# Patient Record
Sex: FEMALE | Race: WHITE | NOT HISPANIC OR LATINO | Employment: FULL TIME | URBAN - METROPOLITAN AREA
[De-identification: names, ages, dates, MRNs, and addresses within clinical notes are randomized per-mention and may not be internally consistent; named-entity substitution may affect disease eponyms.]

---

## 2019-10-16 ENCOUNTER — CONSULT (OUTPATIENT)
Dept: CARDIOLOGY | Facility: CLINIC | Age: 49
End: 2019-10-16

## 2019-10-16 VITALS
HEART RATE: 87 BPM | DIASTOLIC BLOOD PRESSURE: 95 MMHG | WEIGHT: 249.2 LBS | BODY MASS INDEX: 40.05 KG/M2 | SYSTOLIC BLOOD PRESSURE: 142 MMHG | HEIGHT: 66 IN | OXYGEN SATURATION: 97 %

## 2019-10-16 DIAGNOSIS — I49.1 PAC (PREMATURE ATRIAL CONTRACTION): Primary | ICD-10-CM

## 2019-10-16 DIAGNOSIS — G47.33 OSA ON CPAP: ICD-10-CM

## 2019-10-16 DIAGNOSIS — Z99.89 OSA ON CPAP: ICD-10-CM

## 2019-10-16 PROCEDURE — 93000 ELECTROCARDIOGRAM COMPLETE: CPT | Performed by: INTERNAL MEDICINE

## 2019-10-16 PROCEDURE — 99244 OFF/OP CNSLTJ NEW/EST MOD 40: CPT | Performed by: INTERNAL MEDICINE

## 2019-10-16 RX ORDER — CHOLECALCIFEROL (VITAMIN D3) 25 MCG
500 TABLET,CHEWABLE ORAL AS NEEDED
COMMUNITY
End: 2020-11-25

## 2019-10-16 RX ORDER — ATORVASTATIN CALCIUM 40 MG/1
40 TABLET, FILM COATED ORAL DAILY
COMMUNITY
Start: 2019-07-28

## 2019-10-16 RX ORDER — ALENDRONATE SODIUM 70 MG/1
TABLET ORAL
COMMUNITY
End: 2021-03-02

## 2019-10-16 RX ORDER — LEVOTHYROXINE SODIUM 112 MCG
1 TABLET ORAL DAILY
COMMUNITY
Start: 2019-10-12 | End: 2020-10-06

## 2019-10-16 NOTE — PROGRESS NOTES
Electrophysiology Clinic Consult     Tereza Eid  1970  [unfilled]  [unfilled]    10/16/19    DATE OF ADMISSION: (Not on file)  [facility] CARDIOLOGY    Shiela Shepherd MD  [address] / [place] [address]    Chief Complaint   Patient presents with   • Atrial Fibrillation       Problem List:  1. PAC's/NSAT  a. Longstanding history of PAC's/palpitations  b. CHADSVASc = 2, initiated on Xarelto  c. Echocardiogram 2/6/2019: EF 70%, mild MR/TR  d. Event monitor 5/21/2019: Predominantly normal sinus rhythm, PACs, questionable AF more consistent with PAC's/NSAT  e. ER visit 07/2019 with tachy-palpitations, work up unremarkable  2. Hyperlipidemia  3. Hypothyroidism  4. PRACHI, on CPAP  5. GERD  6. Anxiety  7. Surgical history  a. Appendectomy  b. Cholecystectomy  c. Gastrectomy sleeve    History of Present Illness:   Patient is a 48-year-old  female with above-noted past medical history who presents today in consultation by referral from Dr. Shepherd for further evaluation of paroxysmal atrial fibrillation.  She is also currently followed by Dr. Green.  She reports she has had 2 episodes of tachy-palpitations, once in January of this year and again in May.   She was subsequently placed on an event monitor in May.  She was noted to have an episode of what was felt to be atrial fibrillation.  She does report episodes of fluttering with occasional chest pressure, usually lasting seconds, occasionally relieved by coughing, usually worse with anxiety.  She has had PAC's for years and is unable to tell the difference between her PAC's and what she was told was afib.  She reports similar symptoms with her episodes in January and May but those two episodes were much more intense in nature.  She has a CHADSVASc score of 2 and was initiated on Xarelto in August.  She was referred to Dr. Mcghee at [facility] in August and was placed on Xarelto and flecainide but reports she  never started taking this as she was nervous to start it.  Has hashimoto's and reports stable thyroid levels on Synthroid.  She is compliant with CPAP nightly for her PRACHI.  She is a non-smoker, rare caffeine, no ETOH.  No hx of HTN, usually runs on the low side with her blood pressures.      Allergies   Allergen Reactions   • Aleve [Naproxen] Palpitations   • Codeine Palpitations   • Lexapro [Escitalopram Oxalate] Palpitations        Cannot display prior to admission medications because the patient has not been admitted in this contact.            Current Outpatient Medications:   •  alendronate (FOSAMAX) 70 MG tablet, alendronate 70 mg tablet  Take 1 tablet every week by oral route., Disp: , Rfl:   •  atorvastatin (LIPITOR) 10 MG tablet, Take 1 tablet by mouth Daily., Disp: , Rfl:   •  cyanocobalamin (VITAMIN B-12) 2500 MCG tablet tablet, Take 500 mcg by mouth Daily., Disp: , Rfl:   •  Probiotic Product (PROBIOTIC PO), Take 1 tablet by mouth 2 (Two) Times a Day., Disp: , Rfl:   •  rivaroxaban (XARELTO) 20 MG tablet, Xarelto 20 mg tablet  Take 1 tablet every day by oral route., Disp: , Rfl:   •  SYNTHROID 112 MCG tablet, Take 1 tablet by mouth Daily., Disp: , Rfl:     Social History     Socioeconomic History   • Marital status:      Spouse name: Not on file   • Number of children: Not on file   • Years of education: Not on file   • Highest education level: Not on file   Tobacco Use   • Smoking status: Former Smoker     Packs/day: 1.00     Types: Cigarettes     Last attempt to quit:      Years since quittin.8   • Smokeless tobacco: Never Used   Substance and Sexual Activity   • Alcohol use: No     Frequency: Never   • Drug use: No   • Sexual activity: Defer       Family History   Problem Relation Age of Onset   • COPD Mother    • Heart attack Mother    • Kidney failure Father    • Hyperlipidemia Brother    • Hyperlipidemia Sister        REVIEW OF SYSTEMS:   CONST:  No weight loss, fever, chills,  "weakness or fatigue.   HEENT:  No visual loss, blurred vision, double vision, yellow sclerae.                   No hearing loss, congestion, sore throat.   SKIN:      No rashes, urticaria, ulcers, sores.     RESP:     No shortness of breath, hemoptysis, cough, sputum.   GI:           No anorexia, nausea, vomiting, diarrhea. No abdominal pain, melena.   :         No burning on urination, hematuria or increased frequency.  ENDO:    No diaphoresis, cold or heat intolerance. No polyuria or polydipsia.   NEURO:  No headache, dizziness, syncope, paralysis, ataxia, or parasthesias.                  No change in bowel or bladder control. No history of CVA/TIA  MUSC:    No muscle, back pain, joint pain or stiffness.   HEME:    No anemia, bleeding, bruising. No history of DVT/PE.  PSYCH:  + anxiety    Vitals:    10/16/19 1253   BP: 142/95   BP Location: Left arm   Patient Position: Sitting   Pulse: 87   SpO2: 97%   Weight: 113 kg (249 lb 3.2 oz)   Height: 167.6 cm (66\")                 Physical Exam:  GEN: Well nourished, well-developed, no acute distress  HEENT: Normocephalic, atraumatic, PERRLA, moist mucous membranes  NECK: Supple, NO JVD, no thyromegaly, no lymphadenopathy  CARD: S1S2, RRR, no murmur, gallop, rub, PMI NL  LUNGS: Clear to auscultation, normal respiratory effort  ABDOMEN: Soft, nontender, normal bowel sounds  EXTREMITIES: No gross deformities, no clubbing, cyanosis, or edema  SKIN: Warm, dry, No  lesions  NEURO: No focal deficits, alert and oriented x 3  PSYCHIATRIC: Normal affect and mood      I personally viewed and interpreted the patient's EKG/Telemetry/lab data    No results found for: GLUCOSE, CALCIUM, NA, K, CO2, CL, BUN, CREATININE, EGFRIFAFRI, EGFRIFNONA, BCR, ANIONGAP  No results found for: WBC, HGB, HCT, MCV, PLT  No results found for: INR, PROTIME  No results found for: TSH, L3NQOFI, X6OBSQG, THYROIDAB          ECG 12 Lead  Date/Time: 10/16/2019 1:49 PM  Performed by: Christoph Lou" MD  Authorized by: Christoph Luo MD   Comparison: not compared with previous ECG   Previous ECG: no previous ECG available  Rhythm: sinus rhythm  BPM: 87                ICD-10-CM ICD-9-CM   1. PAC (premature atrial contraction) I49.1 427.61   2. PRACHI on CPAP G47.33 327.23    Z99.89 V46.8       Assessment and Plan:   1. PAC:  - Longstanding history of PAC.  Recent event monitor in May demonstrating frequent PAC's which do correlate with patient symptoms of palpitations.  What was felt to be atrial fibrillation appears to be NSAT but no evidence of atrial fibrillation.  Given no true documentation of atrial fibrillation and low risk score for stroke, ok to discontinue Xarelto.  - Discussed options with patient including monitoring for now versus treating medically if symptomatic.  Would avoid BBL use given side effects and limited success.  If she does wish to treat, would use Flecainide for suppression.  She does not wish to treat at this time but will let us know if her symptoms worsen.  She will also let us know if she has any sustained tachy-palpitations.  2. PRACHI:  - Compliant with CPAP nightly    Scribed for Christoph Lou MD by Debbie Armando, APRN. 10/16/2019  1:48 PM     IChristoph MD, personally performed the services described in this documentation as scribed by the above named individual in my presence, and it is both accurate and complete.  10/16/2019  2:06 PM

## 2020-05-16 LAB
EST. AVERAGE GLUCOSE BLD GHB EST-MCNC: 123 MG/DL
FT4I SERPL CALC-MCNC: 2.2 (ref 1.2–4.9)
HBA1C MFR BLD: 5.9 %HB
T3RU NFR SERPL: 25 % (ref 24–39)
T4 SERPL-MCNC: 8.6 UG/DL (ref 4.5–12)
TSH SERPL DL<=0.005 MIU/L-ACNC: 0.79 UIU/ML (ref 0.45–4.5)

## 2020-10-06 ENCOUNTER — OFFICE VISIT (OUTPATIENT)
Dept: BARIATRICS/WEIGHT MGMT | Facility: CLINIC | Age: 50
End: 2020-10-06

## 2020-10-06 VITALS
WEIGHT: 247 LBS | RESPIRATION RATE: 18 BRPM | SYSTOLIC BLOOD PRESSURE: 120 MMHG | BODY MASS INDEX: 38.77 KG/M2 | DIASTOLIC BLOOD PRESSURE: 64 MMHG | TEMPERATURE: 97 F | HEIGHT: 67 IN | OXYGEN SATURATION: 98 % | HEART RATE: 82 BPM

## 2020-10-06 DIAGNOSIS — R10.13 DYSPEPSIA: Primary | ICD-10-CM

## 2020-10-06 DIAGNOSIS — E66.01 MORBID OBESITY (HCC): ICD-10-CM

## 2020-10-06 DIAGNOSIS — Z99.89 OSA ON CPAP: ICD-10-CM

## 2020-10-06 DIAGNOSIS — R73.03 PREDIABETES: ICD-10-CM

## 2020-10-06 DIAGNOSIS — G47.33 OSA ON CPAP: ICD-10-CM

## 2020-10-06 DIAGNOSIS — R12 HEARTBURN: ICD-10-CM

## 2020-10-06 PROCEDURE — 99204 OFFICE O/P NEW MOD 45 MIN: CPT | Performed by: PHYSICIAN ASSISTANT

## 2020-10-06 RX ORDER — LEVOTHYROXINE SODIUM 125 MCG
125 TABLET ORAL EVERY OTHER DAY
COMMUNITY
Start: 2020-10-02 | End: 2020-11-25 | Stop reason: SDUPTHER

## 2020-10-06 RX ORDER — LEVOTHYROXINE SODIUM 112 UG/1
TABLET ORAL
COMMUNITY
Start: 2020-08-01 | End: 2020-10-06 | Stop reason: SDUPTHER

## 2020-10-06 NOTE — PROGRESS NOTES
"[facility] BARIATRIC SURGERY  [address]  [address]  [facility] [id number] [phone]      Patient  Name:  Tereza Eid  :  1970      Date of Visit: 10/06/2020      Chief Complaint:  weight gain; unable to maintain weight loss      History of Present Illness:  Tereza Eid is a 49 y.o. female who presents today for evaluation, education and consultation regarding revision metabolic and bariatric surgery with Dr. Giron.     s/p LSG  GDW.  Presurgery weight: 276 lbs.  Lowest weight: 205 lbs.  Current weight: 247 lbs.     Hoping to have a \"re-sleeve\".  Does not wish to pursue SIPS or bypass at this time.  Feels like her sleeve as stretched out.  Has tried Weight [employer] on several occasions post LSG w/out success.      (+) heartburn.  Takes TUMS prn.  Hx remote H.Pylori.  Denies N/V/abd.pain.  Episodic dysphagia noted, r/t thyroid goiter.  Also w/ chronic constipation - typical bowel pattern 2-3 stools/month, unchanged since having LSG.  Uses MOM + molasses enemas prn.  Recent colonoscopy  unremarkable.          Complete history has been obtained and discussed today, as pertinent to metabolic/ bariatric surgery.     Past Medical History:   Diagnosis Date   • Anxiety    • Chronic constipation    • Depression    • Dyspepsia    • Dyspnea on exertion    • Fatigue    • H. pylori infection     treated x 2, remotely   • Heartburn     prn TUMS   • Hyperlipidemia    • Joint pain     (R) knee, (R) hip   • Menopause    • Morbid obesity (CMS/HCC)    • Osteoporosis     wkly Fosamax   • PAC (premature atrial contraction)     w/ tachycardia, [facility] - no other cardiac issues   • Prediabetes     A1c 5.9   • Sleep apnea     CPAP compliant   • Thyroid goiter     follows w/ endocrinology, on Synthroid, episodic dysphagia     Past Surgical History:   Procedure Laterality Date   • APPENDECTOMY      during expl.lap   • COLONOSCOPY  2019    unremarkable   • " EXPLORATORY LAPAROTOMY      for miscarriage, concomitant appy   • GASTRIC SLEEVE LAPAROSCOPIC      w/ Dr. Giron   • LAPAROSCOPIC CHOLECYSTECTOMY      for stones       Allergies   Allergen Reactions   • Aleve [Naproxen] Palpitations   • Codeine Palpitations   • Lexapro [Escitalopram Oxalate] Palpitations       Current Outpatient Medications:   •  alendronate (FOSAMAX) 70 MG tablet, alendronate 70 mg tablet  Take 1 tablet every week by oral route., Disp: , Rfl:   •  atorvastatin (LIPITOR) 10 MG tablet, Take 1 tablet by mouth Daily., Disp: , Rfl:   •  cyanocobalamin (VITAMIN B-12) 2500 MCG tablet tablet, Take 500 mcg by mouth Daily., Disp: , Rfl:   •  Probiotic Product (PROBIOTIC PO), Take 1 tablet by mouth 2 (Two) Times a Day., Disp: , Rfl:   •  Synthroid 125 MCG tablet, , Disp: , Rfl:     Social History     Socioeconomic History   • Marital status:      Spouse name: Not on file   • Number of children: Not on file   • Years of education: Not on file   • Highest education level: Not on file   Tobacco Use   • Smoking status: Former Smoker     Packs/day: 1.00     Years: 8.00     Pack years: 8.00     Types: Cigarettes     Quit date:      Years since quittin.7   • Smokeless tobacco: Never Used   Substance and Sexual Activity   • Alcohol use: No     Frequency: Never   • Drug use: No   • Sexual activity: Defer   Social History Narrative    .  Lives in [place], [place].  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  HEENT:  denies headache, ear pain or loss of hearing, blurred or double vision, nasal discharge or sore  throat.  Cardiovascular:  reports palpitations w/ PAC and denies HTN, Atrial Fib, chest pain, hx DVT.  Respiratory:  reports sleep apnea and denies cough , wheezing, asthma, hx PE.  Gastrointestinal:  reports heartburn and denies nausea, vomiting, abdominal pain, liver disease.  Genitourinary:  denies history of  frequent UTI, incontinence, hematuria, dysuria, polyuria, polydipsia, renal insufficiency.    Musculoskeletal:   denies fibromyalgia, arthritis and autoimmune disease.  Neurological:   denies migraines, numbness /tingling, dizziness, confusion, seizure.  Psychiatric:  reports hx depression, hx anxiety and denies depressed mood, feeling anxious, bipolar disorder.  Endocrine:  reports glucose intolerance, thyroid disease.  Hematologic:  denies bruising, bleeding disorder, hx anemia, hx blood transfusion.  Skin:  denies rashes, hx MRSA.    Physical Exam:  Vital Signs:  Weight: 112 kg (247 lb)   Body mass index is 38.69 kg/m².  Temp: 97 °F (36.1 °C)   Heart Rate: 82   BP: 120/64     Physical Exam  Vitals signs reviewed.   Constitutional:       Appearance: She is well-developed.      Comments: wearing a mask   HENT:      Head: Normocephalic and atraumatic.   Eyes:      General: No scleral icterus.     Conjunctiva/sclera: Conjunctivae normal.   Neck:      Musculoskeletal: Neck supple.      Thyroid: No thyromegaly.   Cardiovascular:      Rate and Rhythm: Normal rate and regular rhythm.      Heart sounds: No murmur.   Pulmonary:      Effort: Pulmonary effort is normal. No respiratory distress.      Breath sounds: Normal breath sounds. No wheezing or rales.   Abdominal:      General: Bowel sounds are normal. There is no distension.      Palpations: Abdomen is soft. There is no mass.      Tenderness: There is no abdominal tenderness.      Hernia: No hernia is present.      Comments: scars:  LSG, lap garry, lower transverse   Musculoskeletal: Normal range of motion.   Skin:     General: Skin is warm and dry.       Findings: No rash.   Neurological:      Mental Status: She is alert and oriented to person, place, and time.      Gait: Gait normal.   Psychiatric:         Judgment: Judgment normal.         Patient Active Problem List   Diagnosis   • PAC (premature atrial contraction)   • PRACHI on CPAP   • Anxiety   • Depression   • Hyperlipidemia   • Fatigue   • Dyspepsia   • Dyspnea on exertion   • Morbid obesity (CMS/HCC)   • Prediabetes   • Heartburn   • Thyroid goiter   • H. pylori infection   • Joint pain   • Osteoporosis   • Chronic constipation       Assessment:  49 y.o. female with medically complicated obesity pursuing sleeve revision.    Patient's Body mass index is 38.69 kg/m². BMI is above normal parameters. Recommendations include: revision surgery.  Metabolic & Bariatric Surgery is deemed medically necessary given the following obesity related comorbidities: sleep apnea, prediabetes, dyslipidemia and heartburn.    Plan:  Current revision options unknown.  Will schedule UGI + EGD w/ Dr. Giron to further evaluate.     If deemed a revision candidate, additional eval will include: CBC, CMP, Lipids, TSH, HgA1C, H.Pylori UBT, EKG, CXR and cardiac clearance.     Patient understands that bariatric surgery is not cosmetic surgery but rather a tool to help make a lifelong commitment to lifestyle changes including diet, exercise, behavior modifications, and healthy habits.  The patient has been educated today on those expected postoperative lifestyle changes.  Psychological and Nutritional consultations will be arranged prior to surgery.  All questions/concerns have been addressed.      Further input to follow pending the above.           MANI Valdez

## 2020-10-12 ENCOUNTER — TELEPHONE (OUTPATIENT)
Dept: BARIATRICS/WEIGHT MGMT | Facility: CLINIC | Age: 50
End: 2020-10-12

## 2020-10-13 NOTE — TELEPHONE ENCOUNTER
We contact pt back to let her know that the CD was not needed for review. And we also let her know to please follow through with the UGI that was ordered. Pt verbalized understanding.

## 2020-10-19 ENCOUNTER — APPOINTMENT (OUTPATIENT)
Dept: PREADMISSION TESTING | Facility: HOSPITAL | Age: 50
End: 2020-10-19

## 2020-10-19 PROCEDURE — C9803 HOPD COVID-19 SPEC COLLECT: HCPCS

## 2020-10-19 PROCEDURE — U0004 COV-19 TEST NON-CDC HGH THRU: HCPCS

## 2020-10-20 LAB — SARS-COV-2 RNA RESP QL NAA+PROBE: NOT DETECTED

## 2020-10-21 ENCOUNTER — OFFICE VISIT (OUTPATIENT)
Dept: CARDIOLOGY | Facility: CLINIC | Age: 50
End: 2020-10-21

## 2020-10-21 VITALS
SYSTOLIC BLOOD PRESSURE: 130 MMHG | HEIGHT: 67 IN | HEART RATE: 89 BPM | WEIGHT: 251 LBS | TEMPERATURE: 98.2 F | OXYGEN SATURATION: 99 % | BODY MASS INDEX: 39.39 KG/M2 | DIASTOLIC BLOOD PRESSURE: 82 MMHG

## 2020-10-21 DIAGNOSIS — Z99.89 OSA ON CPAP: ICD-10-CM

## 2020-10-21 DIAGNOSIS — G47.33 OSA ON CPAP: ICD-10-CM

## 2020-10-21 DIAGNOSIS — I49.1 PAC (PREMATURE ATRIAL CONTRACTION): Primary | ICD-10-CM

## 2020-10-21 PROCEDURE — 99213 OFFICE O/P EST LOW 20 MIN: CPT | Performed by: INTERNAL MEDICINE

## 2020-10-21 RX ORDER — LEVOTHYROXINE SODIUM 112 UG/1
112 TABLET ORAL EVERY OTHER DAY
COMMUNITY
End: 2020-11-25

## 2020-10-21 NOTE — PROGRESS NOTES
Tereza Eid  1970  [phone]    10/21/2020    [facility] CARDIOLOGY     Fawad Bourne MD  [address] [place] [address]    Chief Complaint   Patient presents with   • premature atrial contractions     Problem List:  1. PAC's/NSAT  a. Longstanding history of PAC's/palpitations  b. CHADSVASc = 2, initiated on Xarelto  c. Echocardiogram 2/6/2019: EF 70%, mild MR/TR  d. Event monitor 5/21/2019: Predominantly normal sinus rhythm, PACs, questionable AF more consistent with PAC's/NSAT  e. ER visit 07/2019 with tachy-palpitations, work up unremarkable  2. SOB  a. Heart cath Dr Green 1/29/2019 LVEF NL, NL cors  3. Hyperlipidemia  4. Hypothyroidism  5. PRACHI, on CPAP  6. GERD  7. Anxiety  8. Surgical history  a. Appendectomy  b. Cholecystectomy  c. Gastrectomy sleeve    Allergies  Allergies   Allergen Reactions   • Aleve [Naproxen] Palpitations   • Codeine Palpitations   • Lexapro [Escitalopram Oxalate] Palpitations       Current Medications    Current Outpatient Medications:   •  alendronate (FOSAMAX) 70 MG tablet, alendronate 70 mg tablet  Take 1 tablet every week by oral route., Disp: , Rfl:   •  atorvastatin (LIPITOR) 10 MG tablet, Take 1 tablet by mouth Daily., Disp: , Rfl:   •  cyanocobalamin (VITAMIN B-12) 2500 MCG tablet tablet, Take 500 mcg by mouth As Needed., Disp: , Rfl:   •  levothyroxine (SYNTHROID, LEVOTHROID) 112 MCG tablet, Take 112 mcg by mouth Every Other Day., Disp: , Rfl:   •  Probiotic Product (PROBIOTIC PO), Take 1 tablet by mouth As Needed., Disp: , Rfl:   •  Synthroid 125 MCG tablet, Take 125 mcg by mouth Every Other Day., Disp: , Rfl:     History of Present Illness     Pt presents for follow up of PAC/NSAT. Since we last saw the pt, pt had abnormal GXT with Nl heart cath in 1/2019. Denies any sustained palps, CP, LH, and dizziness. Denies any hospitalizations, ER visits, bleeding, or TIA/CVA symptoms. Overall feels well. CPAP daily    ROS:  General:   "Denies fatigue, weight gain or loss  Cardiovascular:  Denies CP, PND, syncope, near syncope, edema + palpitations.  Pulmonary:  + ROTH, No cough, or wheezing      Vitals:    10/21/20 1517   BP: 130/82   BP Location: Left arm   Patient Position: Sitting   Cuff Size: Large Adult   Pulse: 89   Temp: 98.2 °F (36.8 °C)   SpO2: 99%   Weight: 114 kg (251 lb)   Height: 170.2 cm (67\")     Body mass index is 39.31 kg/m².  PE:  General: NAD  Neck: no JVD, no carotid bruits, no TM  Heart RRR, NL S1, S2, S4 present, no rubs, murmurs  Lungs: CTA, no wheezes, rhonchi, or rales  Abd: soft, non-tender, NL BS  Ext: No musculoskeletal deformities, no edema, cyanosis, or clubbing  Psych: normal mood and affect    Diagnostic Data:      Procedures    1. PAC (premature atrial contraction)    2. PRACHI on CPAP          Plan:  1) PAC/NSAT: stable off meds    F/up in 12 months      "

## 2020-10-22 ENCOUNTER — HOSPITAL ENCOUNTER (OUTPATIENT)
Dept: GENERAL RADIOLOGY | Facility: HOSPITAL | Age: 50
Discharge: HOME | End: 2020-10-22

## 2020-10-22 DIAGNOSIS — R10.13 DYSPEPSIA: ICD-10-CM

## 2020-10-22 PROCEDURE — 74240 X-RAY XM UPR GI TRC 1CNTRST: CPT

## 2020-10-22 RX ADMIN — BARIUM SULFATE 183 ML: 960 POWDER, FOR SUSPENSION ORAL at 09:19

## 2020-10-26 ENCOUNTER — TELEPHONE (OUTPATIENT)
Dept: BARIATRICS/WEIGHT MGMT | Facility: CLINIC | Age: 50
End: 2020-10-26

## 2020-10-27 NOTE — TELEPHONE ENCOUNTER
Patient notified that UGI revealed reflux and possible hiatal hernia, but o/w looked okay.  She will be called to schedule an EGD/upper endoscopy w/ Dr. Giron as preciously discussed to further evaluate.  Patient verbalized understanding.

## 2020-11-10 ENCOUNTER — TELEMEDICINE (OUTPATIENT)
Dept: BARIATRICS/WEIGHT MGMT | Facility: CLINIC | Age: 50
End: 2020-11-10

## 2020-11-10 DIAGNOSIS — R12 HEARTBURN: Primary | ICD-10-CM

## 2020-11-10 PROCEDURE — 99214 OFFICE O/P EST MOD 30 MIN: CPT | Performed by: SURGERY

## 2020-11-10 NOTE — PROGRESS NOTES
"[facility] Bariatric Surgery  [address]  [address]  [facility] [id number] [phone]        Patient Name: Tereza Eid.  YOB: 1970      Date of Visit: 11/10/2020      Reason for Visit:  heartburn    HPI:  Tereza Eid is a 50 y.o. female s/p LSG by Dr. Kline 2011.  She has regained some weight and is hoping to have a \"re-sleeve.\"  She is not interseted in SIPS or gastric bypass.  She has less satiety, and feels like her sleeve is stretched out.      10/22/20 UGI:  1. Status post vertical sleeve gastrectomy x9 years. There was no  evidence of extraluminal contrast. No postoperative strictures were  seen.  2. Mild esophageal dysmotility  3. Moderate gastroesophageal reflux to the level of the midesophagus  4. Small sized sliding-type hiatal hernia.    She takes Tums PRN for heartburn.        Past Medical History:   Diagnosis Date   • Anxiety    • Chronic constipation    • Depression    • Dyspepsia    • Dyspnea on exertion    • Fatigue    • H. pylori infection     treated x 2, remotely   • Heartburn     prn TUMS   • Hyperlipidemia    • Joint pain     (R) knee, (R) hip   • Menopause    • Morbid obesity (CMS/HCC)    • Osteoporosis     wkly Fosamax   • PAC (premature atrial contraction)     w/ tachycardia, follows w/ Dr. Green @[place] - no other cardiac issues   • Prediabetes     A1c 5.9   • Sleep apnea     CPAP compliant   • Thyroid goiter     follows w/ endocrinology, on Synthroid, episodic dysphagia     Past Surgical History:   Procedure Laterality Date   • APPENDECTOMY  1989    during expl.lap   • CARDIAC CATHETERIZATION     • COLONOSCOPY  08/06/2019    unremarkable   • EXPLORATORY LAPAROTOMY  1989    for miscarriage, concomitant appy   • GASTRIC SLEEVE LAPAROSCOPIC  2011    w/ Dr. Giron   • LAPAROSCOPIC CHOLECYSTECTOMY  2001    for stones     No outpatient medications have been marked as taking for the 11/10/20 encounter (Telemedicine) with Brenda Nayak, " MD.     Allergies   Allergen Reactions   • Aleve [Naproxen] Palpitations   • Codeine Palpitations   • Lexapro [Escitalopram Oxalate] Palpitations       Social History     Socioeconomic History   • Marital status:      Spouse name: Not on file   • Number of children: Not on file   • Years of education: Not on file   • Highest education level: Not on file   Tobacco Use   • Smoking status: Former Smoker     Packs/day: 1.00     Years: 8.00     Pack years: 8.00     Types: Cigarettes     Quit date:      Years since quittin.8   • Smokeless tobacco: Never Used   Substance and Sexual Activity   • Alcohol use: No     Frequency: Never   • Drug use: No   • Sexual activity: Defer   Social History Narrative    .  Lives in [place], [place].  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  Weight    There is no height or weight on file to calculate BMI.    Physical Exam  Constitutional:       General: She is not in acute distress.     Appearance: Normal appearance. She is not ill-appearing.   HENT:      Head: Normocephalic and atraumatic.      Nose: Nose normal.   Eyes:      General: No scleral icterus.     Extraocular Movements: Extraocular movements intact.      Conjunctiva/sclera: Conjunctivae normal.      Pupils: Pupils are equal, round, and reactive to light.   Pulmonary:      Effort: Pulmonary effort is normal. No respiratory distress.   Skin:     Coloration: Skin is not pale.   Neurological:      Mental Status: She is alert and oriented to person, place, and time.   Psychiatric:         Mood and Affect: Mood normal.         Behavior: Behavior normal.           Assessment:      ICD-10-CM ICD-9-CM   1. Heartburn  R12 787.1       Plan:      EGD with biopsy.  Pt instructed to adhere to NPO after midnight, full liquids for 24 hours before procedure.      The risks and benefits of the upper endoscopy were discussed with the patient in detail and  all questions were answered.  Possibility of perforation, bleeding, aspiration, and anesthesia reaction were reviewed.  Patient agrees to proceed.    This visit was conducted as a video visit, in an effort to limit spread of the novel coronavirus during the 2020 pandemic.

## 2020-11-13 ENCOUNTER — APPOINTMENT (OUTPATIENT)
Dept: PREADMISSION TESTING | Facility: HOSPITAL | Age: 50
End: 2020-11-13

## 2020-11-13 PROCEDURE — U0004 COV-19 TEST NON-CDC HGH THRU: HCPCS

## 2020-11-13 PROCEDURE — C9803 HOPD COVID-19 SPEC COLLECT: HCPCS

## 2020-11-14 LAB — SARS-COV-2 RNA RESP QL NAA+PROBE: NOT DETECTED

## 2020-11-25 ENCOUNTER — OFFICE VISIT (OUTPATIENT)
Dept: ENDOCRINOLOGY | Facility: CLINIC | Age: 50
End: 2020-11-25

## 2020-11-25 ENCOUNTER — LAB (OUTPATIENT)
Dept: LAB | Facility: HOSPITAL | Age: 50
End: 2020-11-25

## 2020-11-25 VITALS
BODY MASS INDEX: 39.15 KG/M2 | HEIGHT: 67 IN | TEMPERATURE: 97.5 F | OXYGEN SATURATION: 99 % | DIASTOLIC BLOOD PRESSURE: 78 MMHG | HEART RATE: 89 BPM | SYSTOLIC BLOOD PRESSURE: 128 MMHG | WEIGHT: 249.4 LBS

## 2020-11-25 DIAGNOSIS — E03.9 ACQUIRED HYPOTHYROIDISM: ICD-10-CM

## 2020-11-25 DIAGNOSIS — E11.9 TYPE 2 DIABETES MELLITUS WITHOUT COMPLICATION, WITHOUT LONG-TERM CURRENT USE OF INSULIN (HCC): ICD-10-CM

## 2020-11-25 DIAGNOSIS — E11.9 TYPE 2 DIABETES MELLITUS WITHOUT COMPLICATION, WITHOUT LONG-TERM CURRENT USE OF INSULIN (HCC): Primary | ICD-10-CM

## 2020-11-25 DIAGNOSIS — E53.8 VITAMIN B12 DEFICIENCY: ICD-10-CM

## 2020-11-25 DIAGNOSIS — E04.2 NONTOXIC MULTINODULAR GOITER: ICD-10-CM

## 2020-11-25 PROBLEM — I87.2 PERIPHERAL VENOUS INSUFFICIENCY: Status: RESOLVED | Noted: 2019-08-07 | Resolved: 2020-11-25

## 2020-11-25 PROBLEM — I87.2 PERIPHERAL VENOUS INSUFFICIENCY: Status: ACTIVE | Noted: 2019-08-07

## 2020-11-25 PROBLEM — I48.0 PAROXYSMAL ATRIAL FIBRILLATION (HCC): Status: RESOLVED | Noted: 2019-08-07 | Resolved: 2020-11-25

## 2020-11-25 PROBLEM — I48.0 PAROXYSMAL ATRIAL FIBRILLATION (HCC): Status: ACTIVE | Noted: 2019-08-07

## 2020-11-25 LAB
ALBUMIN SERPL-MCNC: 4.4 G/DL (ref 3.5–5.2)
ALBUMIN UR-MCNC: <1.2 MG/DL
ALBUMIN/GLOB SERPL: 1.3 G/DL
ALP SERPL-CCNC: 106 U/L (ref 39–117)
ALT SERPL W P-5'-P-CCNC: 13 U/L (ref 1–33)
ANION GAP SERPL CALCULATED.3IONS-SCNC: 8.6 MMOL/L (ref 5–15)
AST SERPL-CCNC: 20 U/L (ref 1–32)
BILIRUB SERPL-MCNC: 0.5 MG/DL (ref 0–1.2)
BUN SERPL-MCNC: 9 MG/DL (ref 6–20)
BUN/CREAT SERPL: 13.8 (ref 7–25)
CALCIUM SPEC-SCNC: 9.5 MG/DL (ref 8.6–10.5)
CHLORIDE SERPL-SCNC: 103 MMOL/L (ref 98–107)
CO2 SERPL-SCNC: 29.4 MMOL/L (ref 22–29)
CREAT SERPL-MCNC: 0.65 MG/DL (ref 0.57–1)
CREAT UR-MCNC: 90.9 MG/DL
EXPIRATION DATE: NORMAL
GFR SERPL CREATININE-BSD FRML MDRD: 96 ML/MIN/1.73
GLOBULIN UR ELPH-MCNC: 3.5 GM/DL
GLUCOSE SERPL-MCNC: 75 MG/DL (ref 65–99)
HBA1C MFR BLD: 5.9 %
Lab: NORMAL
MICROALBUMIN/CREAT UR: NORMAL MG/G{CREAT}
POTASSIUM SERPL-SCNC: 4.5 MMOL/L (ref 3.5–5.2)
PROT SERPL-MCNC: 7.9 G/DL (ref 6–8.5)
SODIUM SERPL-SCNC: 141 MMOL/L (ref 136–145)
TSH SERPL DL<=0.05 MIU/L-ACNC: 1.03 UIU/ML (ref 0.27–4.2)
VIT B12 BLD-MCNC: 281 PG/ML (ref 211–946)

## 2020-11-25 PROCEDURE — 84443 ASSAY THYROID STIM HORMONE: CPT

## 2020-11-25 PROCEDURE — 36415 COLL VENOUS BLD VENIPUNCTURE: CPT

## 2020-11-25 PROCEDURE — 80053 COMPREHEN METABOLIC PANEL: CPT

## 2020-11-25 PROCEDURE — 83036 HEMOGLOBIN GLYCOSYLATED A1C: CPT | Performed by: PHYSICIAN ASSISTANT

## 2020-11-25 PROCEDURE — 99214 OFFICE O/P EST MOD 30 MIN: CPT | Performed by: PHYSICIAN ASSISTANT

## 2020-11-25 PROCEDURE — 82607 VITAMIN B-12: CPT

## 2020-11-25 PROCEDURE — 82570 ASSAY OF URINE CREATININE: CPT

## 2020-11-25 PROCEDURE — 82043 UR ALBUMIN QUANTITATIVE: CPT

## 2020-11-25 RX ORDER — LEVOTHYROXINE SODIUM 125 MCG
125 TABLET ORAL EVERY OTHER DAY
Start: 2020-11-25 | End: 2021-06-04

## 2020-11-25 RX ORDER — LANCETS 33 GAUGE
EACH MISCELLANEOUS
COMMUNITY
End: 2020-11-25 | Stop reason: SDUPTHER

## 2020-11-25 RX ORDER — LEVOTHYROXINE SODIUM 112 MCG
112 TABLET ORAL
Start: 2020-11-25 | End: 2021-06-04

## 2020-11-25 RX ORDER — BLOOD SUGAR DIAGNOSTIC
STRIP MISCELLANEOUS
COMMUNITY
Start: 2020-10-09 | End: 2020-11-25 | Stop reason: SDUPTHER

## 2020-11-25 RX ORDER — LANCETS 33 GAUGE
EACH MISCELLANEOUS
Qty: 50 EACH | Refills: 11 | Status: SHIPPED | OUTPATIENT
Start: 2020-11-25 | End: 2022-01-29 | Stop reason: SDUPTHER

## 2020-11-25 RX ORDER — BLOOD SUGAR DIAGNOSTIC
STRIP MISCELLANEOUS
Qty: 50 EACH | Refills: 11 | Status: SHIPPED | OUTPATIENT
Start: 2020-11-25 | End: 2022-01-29 | Stop reason: SDUPTHER

## 2020-11-25 NOTE — PROGRESS NOTES
Office Note      Date: 2020  Patient Name: Tereza Eid  MRN: [id number]  : 1970    Chief Complaint   Patient presents with   • Thyroid Problem       History of Present Illness:   Tereza Eid is a 50 y.o. female who presents today for hypothyroidism and diabetes.  She remains on alternating doses of Synthroid 112mcg and 125mcg every other day.  She reports taking this correctly and regularly.  She notes fatigue.  She notes palpitations - unchanged.  She notes constipation.  She has not noted any changes in the size of her neck.  She notes occasional trouble swallowing.  She reports this is unchanged.  She does not take medication for diabetes.  She is testing FSBS once per day - fasting typically in 90s.  Lipids done with cardiology.  Kidney function to be checked today.  She denies any problems with her feet.  Eye exam up to date.  She is considering gastric sleeve revision.  She reports being diagnosed with a hiatal hernia.  Planning for an endoscopy.  She reports that she continues to have shortness of breath with exertion.  She saw cardiologist earlier this year.  She reports heart cath was okay.      Subjective      Review of Systems:   Review of Systems   Constitutional: Positive for fatigue. Negative for appetite change, chills, fever and unexpected weight change.   HENT: Positive for trouble swallowing (occasional).    Respiratory: Positive for shortness of breath. Negative for cough and wheezing.    Cardiovascular: Positive for palpitations. Negative for chest pain and leg swelling.   Gastrointestinal: Positive for constipation. Negative for abdominal pain, diarrhea, nausea and vomiting.   Endocrine: Negative for cold intolerance, heat intolerance, polydipsia, polyphagia and polyuria.   Musculoskeletal: Positive for arthralgias.   Neurological: Negative for tremors, syncope, weakness, numbness and headaches.       The following portions of the patient's history were reviewed and  "updated as appropriate: allergies, current medications, past family history, past medical history, past social history, past surgical history and problem list.    Objective     Vitals:    11/25/20 1000   BP: 128/78   Pulse: 89   Temp: 97.5 °F (36.4 °C)   TempSrc: Infrared   SpO2: 99%   Weight: 113 kg (249 lb 6.4 oz)   Height: 170.2 cm (67\")     Body mass index is 39.06 kg/m².    Physical Exam  Vitals signs reviewed.   Constitutional:       General: She is not in acute distress.     Appearance: Normal appearance.   Neck:      Musculoskeletal: Normal range of motion and neck supple.      Thyroid: Thyromegaly (diffusely enlarged, irregular gland, no discrete nodule appreciated) present. No thyroid tenderness.   Lymphadenopathy:      Cervical: No cervical adenopathy.   Neurological:      Mental Status: She is alert and oriented to person, place, and time.   Psychiatric:         Attention and Perception: Attention normal.         Mood and Affect: Mood and affect normal.         HEMOGLOBIN A1C  Lab Results   Component Value Date    HGBA1C 5.9 11/25/2020         Current Outpatient Medications   Medication Instructions   • Accu-Chek Romina Plus test strip Testing 1x per day; E11.9   • alendronate (FOSAMAX) 70 MG tablet alendronate 70 mg tablet   Take 1 tablet every week by oral route.   • atorvastatin (LIPITOR) 10 MG tablet 1 tablet, Oral, Daily   • cyanocobalamin (VITAMIN B-12) 500 mcg, Oral, Daily, PRN   • OneTouch Delica Lancets 33G misc Testing 1x per day; E11.9   • Probiotic Product (PROBIOTIC PO) 1 tablet, Oral, As Needed   • Synthroid 112 mcg, Oral, Every 48 Hours   • Synthroid 125 mcg, Oral, Every Other Day       Assessment / Plan      Assessment & Plan:  1. Acquired hypothyroidism  Continue Synthroid.  Will notify her of TSH.  - TSH; Future    2. Nontoxic multinodular goiter  Goiter stable on exam.  Last ultrasound 2 years ago - diffuse heterogeneity without dominant nodule.  Would recommend thyroidectomy if goiter " becomes bothersome enough to her.  Discussed with Dr. Keaton Skinner.  Continue to observe.    3. Type 2 diabetes mellitus without complication, without long-term current use of insulin (CMS/[facility])  A1c looks good.  Will notify her of pending lab results.  - POC Glycosylated Hemoglobin (Hb A1C)  - Comprehensive Metabolic Panel; Future  - Microalbumin / Creatinine Urine Ratio - Urine, Clean Catch; Future    4. Vitamin B12 deficiency  She is not taking a B12 supplement currently.  Will notify her of results.  - Vitamin B12; Future      Return in about 6 months (around 5/25/2021) for Next scheduled follow up.     MANI Sumner  11/25/2020

## 2020-11-28 ENCOUNTER — PATIENT MESSAGE (OUTPATIENT)
Dept: ENDOCRINOLOGY | Facility: CLINIC | Age: 50
End: 2020-11-28

## 2021-01-18 ENCOUNTER — TELEPHONE (OUTPATIENT)
Dept: ENDOCRINOLOGY | Facility: CLINIC | Age: 51
End: 2021-01-18

## 2021-01-18 NOTE — TELEPHONE ENCOUNTER
Approvedtoday  Your PA request has been approved. Additional information will be provided in the approval communication. (Message 114)  Drug  Synthroid 112MCG tablets

## 2021-01-21 ENCOUNTER — TELEPHONE (OUTPATIENT)
Dept: BARIATRICS/WEIGHT MGMT | Facility: CLINIC | Age: 51
End: 2021-01-21

## 2021-01-21 NOTE — TELEPHONE ENCOUNTER
Patient would like to have her EGD rescheduled.  She states if she needs to have an up dated information appointment she would prefer a video visit.

## 2021-02-17 ENCOUNTER — TELEMEDICINE (OUTPATIENT)
Dept: BARIATRICS/WEIGHT MGMT | Facility: CLINIC | Age: 51
End: 2021-02-17

## 2021-02-17 DIAGNOSIS — K21.9 GASTROESOPHAGEAL REFLUX DISEASE, UNSPECIFIED WHETHER ESOPHAGITIS PRESENT: ICD-10-CM

## 2021-02-17 DIAGNOSIS — R10.33 PERIUMBILICAL ABDOMINAL PAIN: ICD-10-CM

## 2021-02-17 DIAGNOSIS — R12 HEARTBURN: ICD-10-CM

## 2021-02-17 DIAGNOSIS — Z98.84 STATUS POST BARIATRIC SURGERY: Primary | ICD-10-CM

## 2021-02-17 PROCEDURE — 99214 OFFICE O/P EST MOD 30 MIN: CPT | Performed by: PHYSICIAN ASSISTANT
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
## 2021-02-19 ENCOUNTER — APPOINTMENT (OUTPATIENT)
Dept: PREADMISSION TESTING | Facility: HOSPITAL | Age: 51
End: 2021-02-19

## 2021-02-19 PROCEDURE — U0004 COV-19 TEST NON-CDC HGH THRU: HCPCS

## 2021-02-19 PROCEDURE — C9803 HOPD COVID-19 SPEC COLLECT: HCPCS

## 2021-02-20 LAB — SARS-COV-2 RNA RESP QL NAA+PROBE: NOT DETECTED

## 2021-02-22 ENCOUNTER — LAB REQUISITION (OUTPATIENT)
Dept: LAB | Facility: HOSPITAL | Age: 51
End: 2021-02-22

## 2021-02-22 ENCOUNTER — OUTSIDE FACILITY SERVICE (OUTPATIENT)
Dept: BARIATRICS/WEIGHT MGMT | Facility: CLINIC | Age: 51
End: 2021-02-22

## 2021-02-22 DIAGNOSIS — R12 HEARTBURN: ICD-10-CM

## 2021-02-22 PROCEDURE — 43239 EGD BIOPSY SINGLE/MULTIPLE: CPT | Performed by: SURGERY

## 2021-02-22 PROCEDURE — 88305 TISSUE EXAM BY PATHOLOGIST: CPT | Performed by: SURGERY

## 2021-02-22 PROCEDURE — 88342 IMHCHEM/IMCYTCHM 1ST ANTB: CPT | Performed by: SURGERY

## 2021-02-24 ENCOUNTER — TELEPHONE (OUTPATIENT)
Dept: CARDIOLOGY | Facility: CLINIC | Age: 51
End: 2021-02-24

## 2021-02-24 LAB
CYTO UR: NORMAL
LAB AP CASE REPORT: NORMAL
LAB AP CLINICAL INFORMATION: NORMAL
PATH REPORT.FINAL DX SPEC: NORMAL
PATH REPORT.GROSS SPEC: NORMAL

## 2021-03-01 DIAGNOSIS — A04.8 H. PYLORI INFECTION: Primary | ICD-10-CM

## 2021-03-02 ENCOUNTER — CONSULT (OUTPATIENT)
Dept: BARIATRICS/WEIGHT MGMT | Facility: CLINIC | Age: 51
End: 2021-03-02

## 2021-03-02 DIAGNOSIS — E11.9 TYPE 2 DIABETES MELLITUS WITHOUT COMPLICATION, WITHOUT LONG-TERM CURRENT USE OF INSULIN (HCC): Primary | ICD-10-CM

## 2021-03-02 DIAGNOSIS — Z99.89 OSA ON CPAP: ICD-10-CM

## 2021-03-02 DIAGNOSIS — I49.1 PAC (PREMATURE ATRIAL CONTRACTION): ICD-10-CM

## 2021-03-02 DIAGNOSIS — G47.33 OSA ON CPAP: ICD-10-CM

## 2021-03-02 DIAGNOSIS — E03.9 ACQUIRED HYPOTHYROIDISM: ICD-10-CM

## 2021-03-02 PROCEDURE — 99214 OFFICE O/P EST MOD 30 MIN: CPT | Performed by: SURGERY

## 2021-03-07 NOTE — PROGRESS NOTES
[facility]  [address]  [address]  [facility] [address]  [phone]      Patient  Name:  Tereza Eid  :  1970      Date of Visit: 3/2/21    Chief Complaint:  weight gain; unable to maintain weight loss.   Evaluate for possible revisional metabolic and bariatric surgery    History of Present Illness:  Tereza Eid is a 50 y.o. female who presents today for evaluation, education and consultation regarding revisional metabolic and bariatric surgery (MBS).  Since last seen 2020 she has gained 3-1/2 pounds.  I have reviewed Jessica Khan PA-C's most recent evaluation dated 2021.     The patient has had issues with morbid obesity for years and only temporary success with surgical and non-surgical methods of weight loss.  The patient is seeking revisional metabolic and bariatric surgery to help with the morbid obesity related conditions of anxiety and depression, chronic constipation, dyspnea on exertion, fatigue, H. pylori gastritis, heartburn, hyperlipidemia, joint pain, osteoporosis, premature atrial contraction with tachycardia, peripheral venous insufficiency, prediabetes, obstructive sleep apnea, thyroid goiter.    50-year-old morbidly obese female from [place].  Known to me status post laparoscopic sleeve gastrectomy and 3 stitch posterior hiatal hernia repair in .  She says she has minimal if any reflux symptoms currently and only rare substernal dysphagia if she eats a large bite of food.  Upper GI in October here at [facility] shows mild esophageal dysmotility and moderate reflux to the level of the mid esophagus and a small sized sliding-type hiatal hernia.  The patient said she was surprised that she had a hiatal hernia because her symptoms are minimal.  I have reviewed the images.  She presented feeling that her sleeve was stretched out and was hoping to have a resleeve procedure if available and appropriate.  She says she has  not been avoiding high fructose corn syrup and only vaguely remembers it being discussed preoperatively.  EGD on 2/22/2021 was unremarkable status post sleeve gastrectomy, I could not exclude a small recurrent hiatal hernia.   Z-line was at 35 cm.  Pathology of her antrum showed mild to moderate chronic gastritis with focal activities and focal changes suggestive of surface erosion, negative for intestinal metaplasia or dysplasia.  No definitive H. pylori organisms seen but other bacterial forms noted.  Immunohistochemical stain for H. pylori was negative.  Distal esophageal biopsies showed squamous mucosa with vascular ectasia and mild increase in intraepithelial leukocytes without eosinophils negative for metaplasia.  Her presurgery weight was 276 pounds.  Her lowest weight was 205 pounds her current weight is 253 pounds with a BMI of 40.2.      Past Medical History:   Diagnosis Date   • Anxiety    • Chronic constipation    • Depression    • Dyspepsia    • Dyspnea on exertion    • Fatigue    • H. pylori infection     treated x 2, remotely   • Heartburn     prn TUMS   • Hyperlipidemia    • Joint pain     (R) knee, (R) hip   • Menopause    • Morbid obesity (CMS/HCC)    • Osteoporosis     wkly Fosamax   • PAC (premature atrial contraction)     w/ tachycardia, [place] [place] other [place] issues   • Peripheral venous insufficiency 8/7/2019   • Prediabetes     A1c 5.9   • Sleep apnea     CPAP compliant   • Thyroid goiter     follows w/ endocrinology, on Synthroid, episodic dysphagia     Past Surgical History:   Procedure Laterality Date   • APPENDECTOMY  1989    during expl.lap   • CARDIAC CATHETERIZATION     • COLONOSCOPY  08/06/2019    unremarkable   • EXPLORATORY LAPAROTOMY  1989    for miscarriage, concomitant appy   • GASTRIC SLEEVE LAPAROSCOPIC  2011    w/ Dr. Giron   • LAPAROSCOPIC CHOLECYSTECTOMY  2001    for stones       Allergies   Allergen Reactions   • Aleve [Naproxen] Palpitations   •  Codeine Palpitations   • Lexapro [Escitalopram Oxalate] Palpitations       Current Outpatient Medications:   •  Accu-Chek Romina Plus test strip, Testing 1x per day; E11.9, Disp: 50 each, Rfl: 11  •  atorvastatin (LIPITOR) 10 MG tablet, Take 1 tablet by mouth Daily., Disp: , Rfl:   •  cyanocobalamin (VITAMIN B-12) 500 MCG tablet, Take 1 tablet by mouth Daily. PRN, Disp: , Rfl:   •  OneTouch Delica Lancets 33G misc, Testing 1x per day; E11.9, Disp: 50 each, Rfl: 11  •  Probiotic Product (PROBIOTIC PO), Take 1 tablet by mouth As Needed., Disp: , Rfl:   •  Synthroid 112 MCG tablet, Take 1 tablet by mouth Every Other Day., Disp: , Rfl:   •  Synthroid 125 MCG tablet, Take 1 tablet by mouth Every Other Day., Disp:  , Rfl:     Social History     Socioeconomic History   • Marital status:      Spouse name: Not on file   • Number of children: Not on file   • Years of education: Not on file   • Highest education level: Not on file   Tobacco Use   • Smoking status: Former Smoker     Packs/day: 1.00     Years: 8.00     Pack years: 8.00     Types: Cigarettes     Quit date:      Years since quittin.2   • Smokeless tobacco: Never Used   Substance and Sexual Activity   • Alcohol use: No   • Drug use: No   • Sexual activity: Defer     Family History   Problem Relation Age of Onset   • COPD Mother    • Heart attack Mother    • Obesity Mother    • Sleep apnea Mother    • Kidney failure Father    • Obesity Sister    • Hyperlipidemia Brother    • Hyperlipidemia Sister    • Obesity Sister        Review of Systems   Constitutional: Positive for fatigue. Negative for chills, diaphoresis, fever and unexpected weight loss.   HENT: Negative for congestion and facial swelling.    Eyes: Negative for blurred vision, double vision and discharge.   Respiratory: Negative for chest tightness, shortness of breath and stridor.    Cardiovascular: Negative for chest pain, palpitations and leg swelling.   Gastrointestinal: Positive for  constipation. Negative for blood in stool.   Endocrine: Negative for polydipsia.   Genitourinary: Negative for hematuria.   Musculoskeletal: Positive for arthralgias.   Skin: Negative for color change.   Allergic/Immunologic: Negative for immunocompromised state.   Neurological: Negative for confusion.   Psychiatric/Behavioral: Negative for self-injury.       I have reviewed the ROS and confirm that it's accurate today.    Physical Exam:  Vital Signs:  Weight: 115 kg (253 lb)   Body mass index is 40.22 kg/m².  Temp: 97.5 °F (36.4 °C)   Heart Rate: 76   BP: 128/76     Physical Exam  Vitals reviewed.   Constitutional:       Appearance: She is well-developed.   HENT:      Head: Normocephalic and atraumatic.      Nose:      Comments: mask  Eyes:      Conjunctiva/sclera: Conjunctivae normal.      Pupils: Pupils are equal, round, and reactive to light.   Neck:      Thyroid: No thyromegaly.      Vascular: No carotid bruit.      Trachea: No tracheal deviation.      Comments: No goiter appreciated  Cardiovascular:      Rate and Rhythm: Normal rate and regular rhythm.      Heart sounds: Normal heart sounds.   Pulmonary:      Effort: Pulmonary effort is normal. No respiratory distress.      Breath sounds: Normal breath sounds.   Abdominal:      General: There is no distension.      Palpations: Abdomen is soft.      Tenderness: There is no abdominal tenderness.      Comments: Large mid abdominal fold.  Low transverse scar.  Laparoscopy scars.   Musculoskeletal:         General: No deformity. Normal range of motion.      Cervical back: Normal range of motion and neck supple.   Skin:     General: Skin is warm and dry.      Findings: No rash.   Neurological:      Mental Status: She is alert and oriented to person, place, and time.      Cranial Nerves: No cranial nerve deficit.      Coordination: Coordination normal.   Psychiatric:         Behavior: Behavior normal.         Thought Content: Thought content normal.         Judgment:  Judgment normal.         Patient Active Problem List   Diagnosis   • PAC (premature atrial contraction)   • PRACHI on CPAP   • Anxiety   • Depression   • Hyperlipidemia   • Fatigue   • Dyspepsia   • Dyspnea on exertion   • Morbid obesity (CMS/HCC)   • Heartburn   • Nontoxic multinodular goiter   • H. pylori infection   • Joint pain   • Osteoporosis   • Chronic constipation   • Type 2 diabetes mellitus without complication, without long-term current use of insulin (CMS/HCC)   • Acquired hypothyroidism       Assessment:    Tereza Eid is a 50 y.o. year old female with medically complicated obesity.    Revisional metabolic and bariatric surgery is deemed medically necessary given the following obesity related comorbidities including anxiety and depression, chronic constipation, dyspnea on exertion, fatigue, H. pylori gastritis, heartburn, hyperlipidemia, joint pain, osteoporosis, premature atrial contraction with tachycardia, peripheral venous insufficiency, prediabetes, obstructive sleep apnea, thyroid goiterwith current Weight: 115 kg (253 lb) and Body mass index is 40.22 kg/m²..    We had a long discussion today with hand-drawn diagrams, flip charts, and Internet diagrams going over the risks, benefits, and alternative therapies to different revision options.  I do not think she would lose much weight with a sleeve revision and I encouraged her to seek second opinion(s) if she remains interested in this option.  Interestingly she said originally she researched for 2 to 3 years prior to proceeding with her original sleeve gastrectomy and decided to see me.  Her insurance company would only approve the gastric bypass and then she says at the last minute they agreed to cover her sleeve.  We also discussed her path and the fact that she has some potential ongoing bacterial forms and I encouraged her to seek a second opinion with gastroenterology regarding the need to treat this, perform follow-up upper endoscopies,  etc.  Of course this would not be an option for the remnant stomach if the patient were to choose Tootie-en-Y gastric bypass.  We did discuss Tootie-en-Y gastric bypass and SIPS and formal DS.  We discussed acute and potential long-term complications of each of the procedures and the importance of regular follow-up and compliance with vitamin mineral supplement recommendations.  We discussed which medications would need to be avoided with which procedures.  She asked several questions and we spent quite some time on this.  I think she is leaning towards SIPS if her insurance will approve it.  In any event I strongly encouraged her to attend my preoperative informed consent class to reeducate herself on the importance of avoiding high fructose corn syrup and eating enough protein daily.  By the end of the conversation she was leaning towards the SIPS and I gave her a printout of the ASMBS position statement supporting this preprocedure from last year.  I also strongly encouraged her to seek second opinion(s) regarding revisional metabolic and bariatric surgical options in general.    Plan: As above the current plan is to submit for SIPS revision and proceed from there.  Encouraged her to attend informed consent class in the meantime as above.  Can consider concomitant hiatal hernia repair at the time of SIPS but as EGD did not completely confirm its presence and she is asymptomatic it is probably best to leave this area undisturbed at the time of SIPS.         Bro Giron MD

## 2021-03-16 VITALS
BODY MASS INDEX: 39.71 KG/M2 | RESPIRATION RATE: 18 BRPM | OXYGEN SATURATION: 98 % | HEIGHT: 67 IN | WEIGHT: 253 LBS | HEART RATE: 76 BPM | TEMPERATURE: 97.5 F | DIASTOLIC BLOOD PRESSURE: 76 MMHG | SYSTOLIC BLOOD PRESSURE: 128 MMHG

## 2021-06-02 ENCOUNTER — OFFICE VISIT (OUTPATIENT)
Dept: ENDOCRINOLOGY | Facility: CLINIC | Age: 51
End: 2021-06-02

## 2021-06-02 ENCOUNTER — PATIENT MESSAGE (OUTPATIENT)
Dept: ENDOCRINOLOGY | Facility: CLINIC | Age: 51
End: 2021-06-02

## 2021-06-02 ENCOUNTER — LAB (OUTPATIENT)
Dept: LAB | Facility: HOSPITAL | Age: 51
End: 2021-06-02

## 2021-06-02 VITALS
HEART RATE: 80 BPM | SYSTOLIC BLOOD PRESSURE: 128 MMHG | DIASTOLIC BLOOD PRESSURE: 80 MMHG | HEIGHT: 67 IN | WEIGHT: 252 LBS | BODY MASS INDEX: 39.55 KG/M2 | OXYGEN SATURATION: 97 %

## 2021-06-02 DIAGNOSIS — E03.9 ACQUIRED HYPOTHYROIDISM: Primary | Chronic | ICD-10-CM

## 2021-06-02 DIAGNOSIS — E11.9 TYPE 2 DIABETES MELLITUS WITHOUT COMPLICATION, WITHOUT LONG-TERM CURRENT USE OF INSULIN (HCC): Chronic | ICD-10-CM

## 2021-06-02 DIAGNOSIS — E04.2 NONTOXIC MULTINODULAR GOITER: ICD-10-CM

## 2021-06-02 DIAGNOSIS — E03.9 ACQUIRED HYPOTHYROIDISM: Primary | ICD-10-CM

## 2021-06-02 DIAGNOSIS — E66.09 CLASS 2 OBESITY DUE TO EXCESS CALORIES WITH BODY MASS INDEX (BMI) OF 39.0 TO 39.9 IN ADULT, UNSPECIFIED WHETHER SERIOUS COMORBIDITY PRESENT: ICD-10-CM

## 2021-06-02 LAB
EXPIRATION DATE: NORMAL
EXPIRATION DATE: NORMAL
GLUCOSE BLDC GLUCOMTR-MCNC: 125 MG/DL (ref 70–130)
HBA1C MFR BLD: 6.2 %
Lab: NORMAL
Lab: NORMAL
TSH SERPL DL<=0.05 MIU/L-ACNC: 0.21 UIU/ML (ref 0.27–4.2)

## 2021-06-02 PROCEDURE — 99214 OFFICE O/P EST MOD 30 MIN: CPT | Performed by: PHYSICIAN ASSISTANT

## 2021-06-02 PROCEDURE — 83036 HEMOGLOBIN GLYCOSYLATED A1C: CPT | Performed by: PHYSICIAN ASSISTANT

## 2021-06-02 PROCEDURE — 82947 ASSAY GLUCOSE BLOOD QUANT: CPT | Performed by: PHYSICIAN ASSISTANT

## 2021-06-02 PROCEDURE — 84443 ASSAY THYROID STIM HORMONE: CPT | Performed by: PHYSICIAN ASSISTANT

## 2021-06-02 RX ORDER — CYANOCOBALAMIN 1000 UG/ML
1000 INJECTION, SOLUTION INTRAMUSCULAR; SUBCUTANEOUS
COMMUNITY

## 2021-06-02 RX ORDER — MULTIPLE VITAMINS W/ MINERALS TAB 9MG-400MCG
1 TAB ORAL DAILY
COMMUNITY

## 2021-06-02 NOTE — PROGRESS NOTES
Office Note      Date: 2021  Patient Name: Tereza Eid  MRN: [id number]  : 1970    Chief Complaint   Patient presents with   • Hypothyroidism   • Diabetes       History of Present Illness:   Tereza Eid is a 50 y.o. female who presents today for hypothyroidism, type 2 diabetes.    She remains on alternating doses of Synthroid 125mcg and 112mcg every other day.  She is taking this correctly and regularly.  She reports that energy level is good some days.  She reports palpitations.  These are unchanged.  She notes constipation.  She has not noted any change in the size of her neck.  She reports occasional trouble swallowing with large bites of food.    She is not taking medication for diabetes.  She is testing fasting blood sugar daily and other times PRN.  Readings have been 103-120.  She reports having glucose of 280 shortly after eating cookies dipped in milk.  She reports symptoms of hypoglycemia a couple of times.  Lowest FSBG 76.  She reports trouble losing weight.  She had sleeve gastrectomy in .  She had consult with bariatric surgeon, Dr. Giron, about surgical revision.  She is considering SIPS procedure.    She reports having Covid-19 in April.  She reports having mild headache, sore throat, and low-grade fever for about 2 days.  She reports having cough, but this only lasted for a couple of hours.      Subjective      Review of Systems:   Review of Systems   Constitutional: Negative.    HENT: Positive for trouble swallowing (occasional).    Cardiovascular: Positive for palpitations.   Gastrointestinal: Positive for constipation.   Endocrine: Negative.    Musculoskeletal: Positive for arthralgias.       The following portions of the patient's history were reviewed and updated as appropriate: allergies, current medications, past family history, past medical history, past social history, past surgical history and problem list.    Objective     Vitals:    21 0948   BP:  "128/80   BP Location: Left arm   Patient Position: Sitting   Cuff Size: Adult   Pulse: 80   SpO2: 97%   Weight: 114 kg (252 lb)   Height: 170.2 cm (67\")   PainSc: 0-No pain     Body mass index is 39.47 kg/m².    Physical Exam  Vitals reviewed.   Constitutional:       General: She is not in acute distress.  Neck:      Thyroid: Thyromegaly (Diffusely enlarged, firm, irregular, no distinct palpable nodules) present. No thyroid tenderness.   Lymphadenopathy:      Cervical: No cervical adenopathy.   Neurological:      Mental Status: She is alert and oriented to person, place, and time.   Psychiatric:         Mood and Affect: Affect normal.         HEMOGLOBIN A1C  Lab Results   Component Value Date    HGBA1C 6.2 06/02/2021    HGBA1C 5.9 11/25/2020    HGBA1C 5.9 05/14/2020     GLUCOSE  Lab Results   Component Value Date    POCGLU 125 06/02/2021         Current Outpatient Medications   Medication Instructions   • Accu-Chek Romina Plus test strip Testing 1x per day; E11.9   • atorvastatin (LIPITOR) 10 MG tablet 1 tablet, Oral, 4 Times Daily   • cyanocobalamin (VITAMIN B-12) 500 mcg, Oral, Daily   • cyanocobalamin 1,000 mcg, Intramuscular, Every 30 Days   • multivitamin with minerals tablet tablet 1 tablet, Oral, Daily   • OneTouch Delica Lancets 33G misc Testing 1x per day; E11.9   • Probiotic Product (PROBIOTIC PO) 1 tablet, Oral, As Needed   • Synthroid 112 mcg, Oral, Every 48 Hours   • Synthroid 125 mcg, Oral, Every Other Day   • vitamin D3 5,000 Units, Oral, Daily       Assessment / Plan      Assessment & Plan:  1. Acquired hypothyroidism  Continue Synthroid.  Check TSH today.  Will notify her of results.  - TSH; Future    2. Nontoxic multinodular goiter  Goiter stable on exam.  Recommend thyroidectomy if goiter becomes bothersome enough to her.  Continue to observe.    3. Type 2 diabetes mellitus without complication, without long-term current use of insulin (CMS/[facility])  A1c has increased and remains in prediabetes range.  " Do not recommend medication at this time.  Encouraged to work on nutritious dietary choices.  Encouraged to avoid added sugar/sweets.  Encouraged at least 150 minutes of moderate intensity exercise per week, divided on at least 3 different days.  - POC Glycosylated Hemoglobin (Hb A1C)  - POC Glucose, Blood    4. Class 2 obesity due to excess calories with body mass index (BMI) of 39.0 to 39.9 in adult, unspecified whether serious comorbidity present  She is considering SIPS revision procedure.  Encouraged nutritious dietary choices and regular exercise.  Recommend that she try logging food and activity in [employer].      Return in about 6 months (around 12/2/2021) for Next scheduled follow up.     MANI Sumner  Endocrinology  06/02/2021

## 2021-06-04 RX ORDER — LEVOTHYROXINE SODIUM 112 MCG
112 TABLET ORAL DAILY
Qty: 90 TABLET | Refills: 1 | Status: SHIPPED | OUTPATIENT
Start: 2021-06-04 | End: 2021-06-07 | Stop reason: SDUPTHER

## 2021-06-07 DIAGNOSIS — E03.9 ACQUIRED HYPOTHYROIDISM: ICD-10-CM

## 2021-06-07 RX ORDER — LEVOTHYROXINE SODIUM 112 MCG
112 TABLET ORAL DAILY
Qty: 30 TABLET | Refills: 5 | Status: SHIPPED | OUTPATIENT
Start: 2021-06-07 | End: 2021-09-29

## 2021-06-26 ENCOUNTER — PATIENT MESSAGE (OUTPATIENT)
Dept: ENDOCRINOLOGY | Facility: CLINIC | Age: 51
End: 2021-06-26

## 2021-06-26 DIAGNOSIS — E66.09 CLASS 2 OBESITY DUE TO EXCESS CALORIES WITH BODY MASS INDEX (BMI) OF 39.0 TO 39.9 IN ADULT, UNSPECIFIED WHETHER SERIOUS COMORBIDITY PRESENT: Primary | ICD-10-CM

## 2021-06-29 RX ORDER — LIRAGLUTIDE 6 MG/ML
3 INJECTION, SOLUTION SUBCUTANEOUS DAILY
Qty: 15 ML | Refills: 2 | Status: SHIPPED | OUTPATIENT
Start: 2021-06-29 | End: 2021-09-28 | Stop reason: SDUPTHER

## 2021-06-29 RX ORDER — PEN NEEDLE, DIABETIC 32GX 5/32"
1 NEEDLE, DISPOSABLE MISCELLANEOUS DAILY
Qty: 50 EACH | Refills: 5 | Status: SHIPPED | OUTPATIENT
Start: 2021-06-29 | End: 2023-01-05

## 2021-07-01 ENCOUNTER — PRIOR AUTHORIZATION (OUTPATIENT)
Dept: ENDOCRINOLOGY | Facility: CLINIC | Age: 51
End: 2021-07-01

## 2021-07-25 ENCOUNTER — PATIENT MESSAGE (OUTPATIENT)
Dept: ENDOCRINOLOGY | Facility: CLINIC | Age: 51
End: 2021-07-25

## 2021-09-28 ENCOUNTER — LAB (OUTPATIENT)
Dept: LAB | Facility: HOSPITAL | Age: 51
End: 2021-09-28

## 2021-09-28 ENCOUNTER — OFFICE VISIT (OUTPATIENT)
Dept: ENDOCRINOLOGY | Facility: CLINIC | Age: 51
End: 2021-09-28

## 2021-09-28 VITALS
HEIGHT: 66 IN | SYSTOLIC BLOOD PRESSURE: 124 MMHG | HEART RATE: 69 BPM | DIASTOLIC BLOOD PRESSURE: 78 MMHG | OXYGEN SATURATION: 98 % | WEIGHT: 232.2 LBS | BODY MASS INDEX: 37.32 KG/M2

## 2021-09-28 DIAGNOSIS — E11.9 TYPE 2 DIABETES MELLITUS WITHOUT COMPLICATION, WITHOUT LONG-TERM CURRENT USE OF INSULIN (HCC): Chronic | ICD-10-CM

## 2021-09-28 DIAGNOSIS — E03.9 ACQUIRED HYPOTHYROIDISM: Chronic | ICD-10-CM

## 2021-09-28 DIAGNOSIS — E53.8 VITAMIN B12 DEFICIENCY: ICD-10-CM

## 2021-09-28 DIAGNOSIS — E04.2 NONTOXIC MULTINODULAR GOITER: ICD-10-CM

## 2021-09-28 DIAGNOSIS — E66.09 CLASS 2 OBESITY DUE TO EXCESS CALORIES WITH BODY MASS INDEX (BMI) OF 37.0 TO 37.9 IN ADULT, UNSPECIFIED WHETHER SERIOUS COMORBIDITY PRESENT: Chronic | ICD-10-CM

## 2021-09-28 DIAGNOSIS — E03.9 ACQUIRED HYPOTHYROIDISM: Primary | Chronic | ICD-10-CM

## 2021-09-28 LAB
ALBUMIN SERPL-MCNC: 4.4 G/DL (ref 3.5–5.2)
ALBUMIN/GLOB SERPL: 1.4 G/DL
ALP SERPL-CCNC: 127 U/L (ref 39–117)
ALT SERPL W P-5'-P-CCNC: 19 U/L (ref 1–33)
ANION GAP SERPL CALCULATED.3IONS-SCNC: 8.3 MMOL/L (ref 5–15)
AST SERPL-CCNC: 20 U/L (ref 1–32)
BILIRUB SERPL-MCNC: 0.6 MG/DL (ref 0–1.2)
BUN SERPL-MCNC: 8 MG/DL (ref 6–20)
BUN/CREAT SERPL: 23.5 (ref 7–25)
CALCIUM SPEC-SCNC: 9.7 MG/DL (ref 8.6–10.5)
CHLORIDE SERPL-SCNC: 102 MMOL/L (ref 98–107)
CO2 SERPL-SCNC: 26.7 MMOL/L (ref 22–29)
CREAT SERPL-MCNC: 0.34 MG/DL (ref 0.57–1)
EXPIRATION DATE: NORMAL
EXPIRATION DATE: NORMAL
GFR SERPL CREATININE-BSD FRML MDRD: >150 ML/MIN/1.73
GLOBULIN UR ELPH-MCNC: 3.1 GM/DL
GLUCOSE BLDC GLUCOMTR-MCNC: 96 MG/DL (ref 70–130)
GLUCOSE SERPL-MCNC: 88 MG/DL (ref 65–99)
HBA1C MFR BLD: 5.5 %
Lab: NORMAL
Lab: NORMAL
POTASSIUM SERPL-SCNC: 4.3 MMOL/L (ref 3.5–5.2)
PROT SERPL-MCNC: 7.5 G/DL (ref 6–8.5)
SODIUM SERPL-SCNC: 137 MMOL/L (ref 136–145)
T3FREE SERPL-MCNC: 3.75 PG/ML (ref 2–4.4)
T4 FREE SERPL-MCNC: 1.64 NG/DL (ref 0.93–1.7)
TSH SERPL DL<=0.05 MIU/L-ACNC: 0.06 UIU/ML (ref 0.27–4.2)
VIT B12 BLD-MCNC: 339 PG/ML (ref 211–946)

## 2021-09-28 PROCEDURE — 82607 VITAMIN B-12: CPT

## 2021-09-28 PROCEDURE — 84443 ASSAY THYROID STIM HORMONE: CPT

## 2021-09-28 PROCEDURE — 83036 HEMOGLOBIN GLYCOSYLATED A1C: CPT | Performed by: PHYSICIAN ASSISTANT

## 2021-09-28 PROCEDURE — 84439 ASSAY OF FREE THYROXINE: CPT

## 2021-09-28 PROCEDURE — 82947 ASSAY GLUCOSE BLOOD QUANT: CPT | Performed by: PHYSICIAN ASSISTANT

## 2021-09-28 PROCEDURE — 80053 COMPREHEN METABOLIC PANEL: CPT

## 2021-09-28 PROCEDURE — 84481 FREE ASSAY (FT-3): CPT

## 2021-09-28 PROCEDURE — 99214 OFFICE O/P EST MOD 30 MIN: CPT | Performed by: PHYSICIAN ASSISTANT

## 2021-09-28 RX ORDER — LIRAGLUTIDE 6 MG/ML
3 INJECTION, SOLUTION SUBCUTANEOUS DAILY
Qty: 15 ML | Refills: 2 | Status: SHIPPED | OUTPATIENT
Start: 2021-09-28 | End: 2022-01-24 | Stop reason: SDUPTHER

## 2021-09-28 NOTE — PROGRESS NOTES
"     Office Note      Date: 2021  Patient Name: Tereza Eid  MRN: [id number]  : 1970    Chief Complaint   Patient presents with   • Follow-up   • Thyroid Problem   • Diabetes       History of Present Illness:   Tereza Eid is a 50 y.o. female who presents today for follow up on hypothyroidism, type 2 diabetes, obesity.  Decreased from alternating doses of Synthroid 125 mcg and 112 mcg every other day to Synthroid 112 mcg daily 3 months ago.  She is taking this correctly and regularly.  She reports feeling okay overall.  Occasional palpitations - unchanged.  She has not noticed any changes in the size of her neck.  No trouble swallowing except large pieces of food.    She is not taking medication for diabetes.  Fasting FSBS has been <120.  She has felt shaky when BG is in the 80s.  She denies any hypoglycemia.    Started Saxenda 2.5 months ago.  She reports tolerating okay.  She has titrated up to near 3 mg daily.  She has noted some diarrhea, but says that this is not consistent.  She reports decreased cravings, appetite.  She has been walking more for exercise.    She has not started B12 injections yet and would like to see what B12 level is before starting these.  Not taking the oral B12 regularly.  She is fasting today.    Subjective      Review of Systems:  Review of Systems   Constitutional: Positive for appetite change.   Cardiovascular: Negative.    Gastrointestinal: Positive for diarrhea (intermittent).   Endocrine: Negative.        The following portions of the patient's history were reviewed and updated as appropriate: allergies, current medications, past family history, past medical history, past social history, past surgical history and problem list.    Objective     Vitals:    21 0755   BP: 124/78   Pulse: 69   SpO2: 98%   Weight: 105 kg (232 lb 3.2 oz)   Height: 167.6 cm (66\")   PainSc: 0-No pain     Body mass index is 37.48 kg/m².    Physical Exam  Vitals reviewed. "   Constitutional:       General: She is not in acute distress.  Neck:      Thyroid: Thyromegaly (Diffusely enlarged, firm, irregular, no distinct palpable nodules) present. No thyroid tenderness.   Lymphadenopathy:      Cervical: No cervical adenopathy.   Neurological:      Mental Status: She is alert and oriented to person, place, and time.   Psychiatric:         Mood and Affect: Affect normal.         TSH  Lab Results   Component Value Date    TSH 0.209 (L) 06/02/2021     Lab Results   Component Value Date    HGBA1C 5.5 09/28/2021    HGBA1C 6.2 06/02/2021    HGBA1C 5.9 11/25/2020       Current Outpatient Medications   Medication Instructions   • Accu-Chek Romina Plus test strip Testing 1x per day; E11.9   • atorvastatin (LIPITOR) 40 mg, Oral, Daily   • cyanocobalamin (VITAMIN B-12) 500 mcg, Oral, Daily   • cyanocobalamin 1,000 mcg, Intramuscular, Every 30 Days   • Insulin Pen Needle (BD Pen Needle Margareth U/F) 32G X 4 MM misc 1 each, Does not apply, Daily   • multivitamin with minerals tablet tablet 1 tablet, Oral, Daily   • OneTouch Delica Lancets 33G misc Testing 1x per day; E11.9   • Probiotic Product (PROBIOTIC PO) 1 tablet, Oral, As Needed   • Saxenda 3 mg, Subcutaneous, Daily   • Synthroid 112 mcg, Oral, Daily   • vitamin D3 5,000 Units, Oral, Daily       Assessment / Plan      Assessment & Plan:  1. Acquired hypothyroidism  Clinically euthyroid.  Continue Synthroid.  Check TFTs today.  - TSH; Future  - T4, Free; Future  - T3, Free; Future    2.  Nontoxic multinodular goiter  Stable on exam.  Recommend thyroidectomy if goiter becomes bothersome enough to her.  Will continue to monitor.    3. Class 2 obesity due to excess calories with body mass index (BMI) of 37.0 to 37.9 in adult, unspecified whether serious comorbidity present  Weight is down 20 pounds.  She will continue the Saxenda.  Encouraged to keep working on nutritious dietary choices and regular exercise.  - Saxenda 18 MG/3ML injection pen; Inject 3 mg  under the skin into the appropriate area as directed Daily.  Dispense: 15 mL; Refill: 2    4. Type 2 diabetes mellitus without complication, without long-term current use of insulin (CMS/[facility])  A1c looks good.  - POC Glucose, Blood  - POC Glycosylated Hemoglobin (Hb A1C)  - Comprehensive Metabolic Panel; Future    5. Vitamin B12 deficiency  - Vitamin B12; Future      Return in about 3 months (around 12/28/2021) for next scheduled follow up. She was advised to contact the office with any interval questions or concerns.    MANI Sumner  Endocrinology  09/28/2021

## 2021-09-29 DIAGNOSIS — E03.9 ACQUIRED HYPOTHYROIDISM: Primary | ICD-10-CM

## 2021-09-29 RX ORDER — LEVOTHYROXINE SODIUM 100 MCG
100 TABLET ORAL DAILY
Qty: 30 TABLET | Refills: 5 | Status: SHIPPED | OUTPATIENT
Start: 2021-09-29 | End: 2022-03-28 | Stop reason: SDUPTHER

## 2021-11-18 ENCOUNTER — PRIOR AUTHORIZATION (OUTPATIENT)
Dept: ENDOCRINOLOGY | Facility: CLINIC | Age: 51
End: 2021-11-18

## 2021-11-18 NOTE — TELEPHONE ENCOUNTER
"ASHER HURST (Key: [id number])  Saxenda 18MG/3ML pen-injectors     Form  [facility] Electronic PA Form (2017 NCPDP)  Created  1 day ago  Sent to Plan  1 day ago  Plan Response  1 day ago  Submit Clinical Questions  1 day ago  Determination  Favorable  7 hours ago  Your prior authorization for Saxenda has been approved!  MORE INFO  For eligible patients, copay assistance may be available. To learn more and be redirected to the Saxenda® website, click on the \"More Info\" button to the right. Please also note that you may need to schedule a follow-up visit with your patient prior to the expiration of this prior authorization, as updated patient weight may be required for reauthorization.    Message from plan: Your PA request has been approved. Additional information will be provided in the approval communication.   "

## 2021-12-09 ENCOUNTER — PATIENT MESSAGE (OUTPATIENT)
Dept: ENDOCRINOLOGY | Facility: CLINIC | Age: 51
End: 2021-12-09

## 2022-01-21 ENCOUNTER — LAB (OUTPATIENT)
Dept: LAB | Facility: HOSPITAL | Age: 52
End: 2022-01-21

## 2022-01-21 ENCOUNTER — OFFICE VISIT (OUTPATIENT)
Dept: ENDOCRINOLOGY | Facility: CLINIC | Age: 52
End: 2022-01-21

## 2022-01-21 VITALS
BODY MASS INDEX: 34.72 KG/M2 | OXYGEN SATURATION: 98 % | WEIGHT: 216 LBS | DIASTOLIC BLOOD PRESSURE: 78 MMHG | HEIGHT: 66 IN | SYSTOLIC BLOOD PRESSURE: 122 MMHG | HEART RATE: 75 BPM

## 2022-01-21 DIAGNOSIS — E03.9 ACQUIRED HYPOTHYROIDISM: Primary | Chronic | ICD-10-CM

## 2022-01-21 DIAGNOSIS — E03.9 ACQUIRED HYPOTHYROIDISM: Chronic | ICD-10-CM

## 2022-01-21 DIAGNOSIS — E53.8 VITAMIN B12 DEFICIENCY: ICD-10-CM

## 2022-01-21 DIAGNOSIS — E66.09 CLASS 1 OBESITY DUE TO EXCESS CALORIES WITH BODY MASS INDEX (BMI) OF 34.0 TO 34.9 IN ADULT, UNSPECIFIED WHETHER SERIOUS COMORBIDITY PRESENT: Chronic | ICD-10-CM

## 2022-01-21 DIAGNOSIS — E11.9 TYPE 2 DIABETES MELLITUS WITHOUT COMPLICATION, WITHOUT LONG-TERM CURRENT USE OF INSULIN: Chronic | ICD-10-CM

## 2022-01-21 DIAGNOSIS — E55.9 VITAMIN D DEFICIENCY: ICD-10-CM

## 2022-01-21 LAB
25(OH)D3 SERPL-MCNC: 38.5 NG/ML
ALBUMIN SERPL-MCNC: 4.2 G/DL (ref 3.5–5.2)
ALBUMIN/GLOB SERPL: 1.4 G/DL
ALP SERPL-CCNC: 133 U/L (ref 39–117)
ALT SERPL W P-5'-P-CCNC: 16 U/L (ref 1–33)
ANION GAP SERPL CALCULATED.3IONS-SCNC: 6.9 MMOL/L (ref 5–15)
AST SERPL-CCNC: 22 U/L (ref 1–32)
BILIRUB SERPL-MCNC: 0.6 MG/DL (ref 0–1.2)
BUN SERPL-MCNC: 7 MG/DL (ref 6–20)
BUN/CREAT SERPL: 10.3 (ref 7–25)
CALCIUM SPEC-SCNC: 9.4 MG/DL (ref 8.6–10.5)
CHLORIDE SERPL-SCNC: 103 MMOL/L (ref 98–107)
CO2 SERPL-SCNC: 29.1 MMOL/L (ref 22–29)
CREAT SERPL-MCNC: 0.68 MG/DL (ref 0.57–1)
EXPIRATION DATE: NORMAL
EXPIRATION DATE: NORMAL
GFR SERPL CREATININE-BSD FRML MDRD: 91 ML/MIN/1.73
GLOBULIN UR ELPH-MCNC: 3.1 GM/DL
GLUCOSE BLDC GLUCOMTR-MCNC: 109 MG/DL (ref 70–130)
GLUCOSE SERPL-MCNC: 82 MG/DL (ref 65–99)
HBA1C MFR BLD: 5.6 %
Lab: NORMAL
Lab: NORMAL
POTASSIUM SERPL-SCNC: 4.4 MMOL/L (ref 3.5–5.2)
PROT SERPL-MCNC: 7.3 G/DL (ref 6–8.5)
SODIUM SERPL-SCNC: 139 MMOL/L (ref 136–145)
TSH SERPL DL<=0.05 MIU/L-ACNC: 0.41 UIU/ML (ref 0.27–4.2)
VIT B12 BLD-MCNC: 195 PG/ML (ref 211–946)

## 2022-01-21 PROCEDURE — 82306 VITAMIN D 25 HYDROXY: CPT

## 2022-01-21 PROCEDURE — 82947 ASSAY GLUCOSE BLOOD QUANT: CPT | Performed by: PHYSICIAN ASSISTANT

## 2022-01-21 PROCEDURE — 82607 VITAMIN B-12: CPT

## 2022-01-21 PROCEDURE — 83036 HEMOGLOBIN GLYCOSYLATED A1C: CPT | Performed by: PHYSICIAN ASSISTANT

## 2022-01-21 PROCEDURE — 99214 OFFICE O/P EST MOD 30 MIN: CPT | Performed by: PHYSICIAN ASSISTANT

## 2022-01-21 PROCEDURE — 80053 COMPREHEN METABOLIC PANEL: CPT

## 2022-01-21 PROCEDURE — 84443 ASSAY THYROID STIM HORMONE: CPT

## 2022-01-24 DIAGNOSIS — E66.09 CLASS 2 OBESITY DUE TO EXCESS CALORIES WITH BODY MASS INDEX (BMI) OF 37.0 TO 37.9 IN ADULT, UNSPECIFIED WHETHER SERIOUS COMORBIDITY PRESENT: Chronic | ICD-10-CM

## 2022-01-24 RX ORDER — LIRAGLUTIDE 6 MG/ML
3 INJECTION, SOLUTION SUBCUTANEOUS DAILY
Qty: 15 ML | Refills: 2 | Status: SHIPPED | OUTPATIENT
Start: 2022-01-24 | End: 2022-05-17 | Stop reason: SDUPTHER

## 2022-01-26 ENCOUNTER — PATIENT MESSAGE (OUTPATIENT)
Dept: ENDOCRINOLOGY | Facility: CLINIC | Age: 52
End: 2022-01-26

## 2022-01-31 RX ORDER — LANCETS 33 GAUGE
EACH MISCELLANEOUS
Qty: 50 EACH | Refills: 11 | Status: SHIPPED | OUTPATIENT
Start: 2022-01-31

## 2022-01-31 RX ORDER — LANCETS 33 GAUGE
EACH MISCELLANEOUS
Qty: 100 EACH | Refills: 3 | Status: SHIPPED | OUTPATIENT
Start: 2022-01-31 | End: 2022-11-18

## 2022-01-31 RX ORDER — BLOOD SUGAR DIAGNOSTIC
STRIP MISCELLANEOUS
Qty: 100 EACH | Refills: 3 | Status: SHIPPED | OUTPATIENT
Start: 2022-01-31 | End: 2022-11-18

## 2022-01-31 RX ORDER — BLOOD SUGAR DIAGNOSTIC
STRIP MISCELLANEOUS
Qty: 50 EACH | Refills: 11 | Status: SHIPPED | OUTPATIENT
Start: 2022-01-31 | End: 2022-05-04

## 2022-03-28 DIAGNOSIS — E03.9 ACQUIRED HYPOTHYROIDISM: ICD-10-CM

## 2022-03-28 RX ORDER — LEVOTHYROXINE SODIUM 100 MCG
100 TABLET ORAL DAILY
Qty: 30 TABLET | Refills: 5 | Status: SHIPPED | OUTPATIENT
Start: 2022-03-28 | End: 2022-05-23

## 2022-05-04 ENCOUNTER — OFFICE VISIT (OUTPATIENT)
Dept: CARDIOLOGY | Facility: CLINIC | Age: 52
End: 2022-05-04

## 2022-05-04 VITALS
WEIGHT: 210 LBS | BODY MASS INDEX: 33.75 KG/M2 | HEIGHT: 66 IN | OXYGEN SATURATION: 99 % | SYSTOLIC BLOOD PRESSURE: 106 MMHG | HEART RATE: 88 BPM | DIASTOLIC BLOOD PRESSURE: 74 MMHG

## 2022-05-04 DIAGNOSIS — I49.1 PAC (PREMATURE ATRIAL CONTRACTION): Primary | ICD-10-CM

## 2022-05-04 DIAGNOSIS — Z99.89 OSA ON CPAP: ICD-10-CM

## 2022-05-04 DIAGNOSIS — G47.33 OSA ON CPAP: ICD-10-CM

## 2022-05-04 PROCEDURE — 99213 OFFICE O/P EST LOW 20 MIN: CPT | Performed by: INTERNAL MEDICINE

## 2022-05-04 NOTE — PROGRESS NOTES
Tereza Eid  1970  [phone]    05/04/2022    [facility] CARDIOLOGY     Referring Provider: No ref. provider found     Fawad Bourne MD  [address] [place] [address]    Chief Complaint   Patient presents with   • Palpitations       Problem List:  1. PAC's/NSAT  a. Longstanding history of PAC's/palpitations  b. CHADSVASc = 2, initiated on Xarelto  c. Echocardiogram 2/6/2019: EF 70%, mild MR/TR  d. Event monitor 5/21/2019: Predominantly normal sinus rhythm, PACs, questionable AF more consistent with PAC's/NSAT  e. ER visit 07/2019 with tachy-palpitations, work up unremarkable  2. SOB  a. Heart cath Dr Green 1/29/2019 LVEF NL, NL cors  3. Hyperlipidemia  4. Hypothyroidism  5. PRACHI, on CPAP  6. GERD  7. Anxiety  8. Surgical history  a. Appendectomy  b. Cholecystectomy  c. Gastrectomy sleeve      Allergies  Allergies   Allergen Reactions   • Aleve [Naproxen] Palpitations   • Codeine Palpitations   • Lexapro [Escitalopram Oxalate] Palpitations       Current Medications    Current Outpatient Medications:   •  Accu-Chek Romina Plus test strip, USE ONE STRIP TO TEST DAILY, Disp: 100 each, Rfl: 3  •  atorvastatin (LIPITOR) 40 MG tablet, Take 40 mg by mouth Daily., Disp: , Rfl:   •  cyanocobalamin (VITAMIN B-12) 500 MCG tablet, Take 1 tablet by mouth Daily., Disp: , Rfl:   •  cyanocobalamin 1000 MCG/ML injection, Inject 1,000 mcg into the appropriate muscle as directed by prescriber Every 30 (Thirty) Days., Disp: , Rfl:   •  Insulin Pen Needle (BD Pen Needle Margareth U/F) 32G X 4 MM misc, 1 each Daily., Disp: 50 each, Rfl: 5  •  Lancets (OneTouch Delica Plus [id number]) misc, USE ONE TO TEST GLUCOSE DAILY, Disp: 100 each, Rfl: 3  •  multivitamin with minerals tablet tablet, Take 1 tablet by mouth Daily., Disp: , Rfl:   •  OneTouch Delica Lancets 33G misc, Testing 1x per day; E11.9, Disp: 50 each, Rfl: 11  •  Probiotic Product (PROBIOTIC PO), Take 1 tablet by mouth As Needed., Disp: , Rfl:  "  •  Saxenda 18 MG/3ML injection pen, Inject 3 mg under the skin into the appropriate area as directed Daily., Disp: 15 mL, Rfl: 2  •  Synthroid 100 MCG tablet, Take 1 tablet by mouth Daily., Disp: 30 tablet, Rfl: 5  •  vitamin D3 125 MCG (5000 UT) capsule capsule, Take 5,000 Units by mouth Daily., Disp: , Rfl:     History of Present Illness:      Pt presents for follow up of PACs, NSAT. Since we last saw the pt, she has a occasional palpitations that are mild in nature and don't change her activities of daily living. She denies SOB, CP, LH, and dizziness, syncope.  Denies any hospitalizations,  bleeding, or TIA/CVA symptoms. Overall feels well. She is not wearing her CPAP right now b/c it was recalled. She follows with Dr. Shantell Ramon.     ROS:  General:  Denies fatigue, weight gain or loss  Cardiovascular:  Denies CP, PND, syncope, near syncope, edema or palpitations.  Pulmonary:  Denies ROTH, cough, or wheezing      Vitals:    05/04/22 1314   BP: 106/74   BP Location: Right arm   Patient Position: Sitting   Pulse: 88   SpO2: 99%   Weight: 95.3 kg (210 lb)   Height: 167.6 cm (66\")     Body mass index is 33.89 kg/m².  PE:  General: NAD  Neck: no JVD, no carotid bruits, no TM  Heart RRR, NL S1, S2, S4 present, no rubs, murmurs  Lungs: CTA, no wheezes, rhonchi, or rales  Abd: soft, non-tender, NL BS  Ext: No musculoskeletal deformities, no edema, cyanosis, or clubbing  Psych: normal mood and affect    Diagnostic Data:      Procedures    1. PAC (premature atrial contraction)    2. PRACHI on CPAP          Plan:  1. PAC/NSAT:   - doing well off medications.     2. PRACHI:   - waiting on new CPAP, follows with Dr. Ramon     F/up in 12 months    Electronically signed by MANI Tobin, 05/04/22, 1:17 PM EDT.    "

## 2022-05-17 ENCOUNTER — OFFICE VISIT (OUTPATIENT)
Dept: ENDOCRINOLOGY | Facility: CLINIC | Age: 52
End: 2022-05-17

## 2022-05-17 ENCOUNTER — PATIENT MESSAGE (OUTPATIENT)
Dept: ENDOCRINOLOGY | Facility: CLINIC | Age: 52
End: 2022-05-17

## 2022-05-17 ENCOUNTER — LAB (OUTPATIENT)
Dept: LAB | Facility: HOSPITAL | Age: 52
End: 2022-05-17

## 2022-05-17 VITALS
BODY MASS INDEX: 33.43 KG/M2 | OXYGEN SATURATION: 98 % | WEIGHT: 208 LBS | DIASTOLIC BLOOD PRESSURE: 73 MMHG | HEART RATE: 77 BPM | HEIGHT: 66 IN | SYSTOLIC BLOOD PRESSURE: 108 MMHG

## 2022-05-17 DIAGNOSIS — R74.8 ELEVATED SERUM ALKALINE PHOSPHATASE LEVEL: ICD-10-CM

## 2022-05-17 DIAGNOSIS — E55.9 VITAMIN D DEFICIENCY: ICD-10-CM

## 2022-05-17 DIAGNOSIS — E53.8 VITAMIN B12 DEFICIENCY: ICD-10-CM

## 2022-05-17 DIAGNOSIS — E03.9 ACQUIRED HYPOTHYROIDISM: Primary | Chronic | ICD-10-CM

## 2022-05-17 DIAGNOSIS — E03.9 ACQUIRED HYPOTHYROIDISM: ICD-10-CM

## 2022-05-17 DIAGNOSIS — E11.9 TYPE 2 DIABETES MELLITUS WITHOUT COMPLICATION, WITHOUT LONG-TERM CURRENT USE OF INSULIN: Chronic | ICD-10-CM

## 2022-05-17 DIAGNOSIS — E66.09 CLASS 2 OBESITY DUE TO EXCESS CALORIES WITH BODY MASS INDEX (BMI) OF 37.0 TO 37.9 IN ADULT, UNSPECIFIED WHETHER SERIOUS COMORBIDITY PRESENT: Chronic | ICD-10-CM

## 2022-05-17 DIAGNOSIS — E11.9 TYPE 2 DIABETES MELLITUS WITHOUT COMPLICATION, WITHOUT LONG-TERM CURRENT USE OF INSULIN: ICD-10-CM

## 2022-05-17 LAB
25(OH)D3 SERPL-MCNC: 41.3 NG/ML (ref 30–100)
ALBUMIN SERPL-MCNC: 4.3 G/DL (ref 3.5–5.2)
ALP SERPL-CCNC: 139 U/L (ref 39–117)
ALT SERPL W P-5'-P-CCNC: 20 U/L (ref 1–33)
ANION GAP SERPL CALCULATED.3IONS-SCNC: 11.5 MMOL/L (ref 5–15)
AST SERPL-CCNC: 24 U/L (ref 1–32)
BILIRUB CONJ SERPL-MCNC: <0.2 MG/DL (ref 0–0.3)
BILIRUB INDIRECT SERPL-MCNC: ABNORMAL MG/DL
BILIRUB SERPL-MCNC: 0.7 MG/DL (ref 0–1.2)
BUN SERPL-MCNC: 9 MG/DL (ref 6–20)
BUN/CREAT SERPL: 13.2 (ref 7–25)
CALCIUM SPEC-SCNC: 9.2 MG/DL (ref 8.6–10.5)
CHLORIDE SERPL-SCNC: 104 MMOL/L (ref 98–107)
CO2 SERPL-SCNC: 24.5 MMOL/L (ref 22–29)
CREAT SERPL-MCNC: 0.68 MG/DL (ref 0.57–1)
EGFRCR SERPLBLD CKD-EPI 2021: 104.3 ML/MIN/1.73
EXPIRATION DATE: NORMAL
EXPIRATION DATE: NORMAL
GLUCOSE BLDC GLUCOMTR-MCNC: 96 MG/DL (ref 70–130)
GLUCOSE SERPL-MCNC: 77 MG/DL (ref 65–99)
HBA1C MFR BLD: 5.4 %
Lab: NORMAL
Lab: NORMAL
POTASSIUM SERPL-SCNC: 4.3 MMOL/L (ref 3.5–5.2)
PROT SERPL-MCNC: 7.1 G/DL (ref 6–8.5)
SODIUM SERPL-SCNC: 140 MMOL/L (ref 136–145)
T4 FREE SERPL-MCNC: 1.51 NG/DL (ref 0.93–1.7)
TSH SERPL DL<=0.05 MIU/L-ACNC: 0.11 UIU/ML (ref 0.27–4.2)
VIT B12 BLD-MCNC: 919 PG/ML (ref 211–946)

## 2022-05-17 PROCEDURE — 83036 HEMOGLOBIN GLYCOSYLATED A1C: CPT | Performed by: PHYSICIAN ASSISTANT

## 2022-05-17 PROCEDURE — 84080 ASSAY ALKALINE PHOSPHATASES: CPT

## 2022-05-17 PROCEDURE — 82306 VITAMIN D 25 HYDROXY: CPT

## 2022-05-17 PROCEDURE — 84075 ASSAY ALKALINE PHOSPHATASE: CPT

## 2022-05-17 PROCEDURE — 99214 OFFICE O/P EST MOD 30 MIN: CPT | Performed by: PHYSICIAN ASSISTANT

## 2022-05-17 PROCEDURE — 80076 HEPATIC FUNCTION PANEL: CPT

## 2022-05-17 PROCEDURE — 82947 ASSAY GLUCOSE BLOOD QUANT: CPT | Performed by: PHYSICIAN ASSISTANT

## 2022-05-17 PROCEDURE — 80048 BASIC METABOLIC PNL TOTAL CA: CPT

## 2022-05-17 PROCEDURE — 36415 COLL VENOUS BLD VENIPUNCTURE: CPT

## 2022-05-17 PROCEDURE — 82607 VITAMIN B-12: CPT

## 2022-05-17 PROCEDURE — 84439 ASSAY OF FREE THYROXINE: CPT

## 2022-05-17 PROCEDURE — 84443 ASSAY THYROID STIM HORMONE: CPT

## 2022-05-17 RX ORDER — LIRAGLUTIDE 6 MG/ML
3 INJECTION, SOLUTION SUBCUTANEOUS DAILY
Qty: 45 ML | Refills: 1 | Status: SHIPPED | OUTPATIENT
Start: 2022-05-17 | End: 2022-11-28

## 2022-05-19 LAB
ALP BONE CFR SERPL: 34 % (ref 14–68)
ALP INTEST CFR SERPL: 0 % (ref 0–18)
ALP LIVER CFR SERPL: 66 % (ref 18–85)
ALP SERPL-CCNC: 146 IU/L (ref 44–121)

## 2022-05-23 ENCOUNTER — DOCUMENTATION (OUTPATIENT)
Dept: ENDOCRINOLOGY | Facility: CLINIC | Age: 52
End: 2022-05-23

## 2022-05-23 ENCOUNTER — TELEPHONE (OUTPATIENT)
Dept: ENDOCRINOLOGY | Facility: CLINIC | Age: 52
End: 2022-05-23

## 2022-05-23 DIAGNOSIS — M81.0 OSTEOPOROSIS WITHOUT CURRENT PATHOLOGICAL FRACTURE, UNSPECIFIED OSTEOPOROSIS TYPE: ICD-10-CM

## 2022-05-23 DIAGNOSIS — E03.9 ACQUIRED HYPOTHYROIDISM: Primary | ICD-10-CM

## 2022-05-23 RX ORDER — LEVOTHYROXINE SODIUM 88 MCG
88 TABLET ORAL DAILY
Qty: 90 TABLET | Refills: 1 | Status: SHIPPED | OUTPATIENT
Start: 2022-05-23 | End: 2022-11-29 | Stop reason: SDUPTHER

## 2022-05-23 NOTE — TELEPHONE ENCOUNTER
Pt called checking on the status on her lab results done on 05/17/22. Pt sent a my chart message on 05/17/22 no response. Please contact pt she is 2 days left of Synthroid 100 mcg. Pt wants to know if any adjustment is needed. Please notify pt

## 2022-05-23 NOTE — TELEPHONE ENCOUNTER
Spoke with patient discussed lab results.  TSH low.  Decrease Synthroid to 88 mcg daily.  Lab order mailed for TSH in 6-8 weeks.  B12 level looked much better.  25-OH vitamin D okay.  Continue B12 supplement and vitamin D supplement.  Alk phos isoenzymes did not reveal source of elevation.  Check intact PTH when repeat TSH in 6-8 weeks.  She will start calcium supplement as recommended.  Continue to monitor alk phos.

## 2022-11-10 ENCOUNTER — SPECIALTY PHARMACY (OUTPATIENT)
Dept: ENDOCRINOLOGY | Facility: CLINIC | Age: 52
End: 2022-11-10

## 2022-11-10 NOTE — PROGRESS NOTES
Specialty Pharmacy Patient Management Program  Endocrinology Benefits Investigation      Tereza is seen by a [facility] Endocrinology provider and is currently taking a target specialty medication.  The patient IS ELIGIBLE for enrollment in the Endocrinology Patient Management Program offered by [facility].     Insurance: [employer] ([facility])   Medication(s) and Costs:   Saxenda 28 Days, $24.98

## 2022-11-18 ENCOUNTER — LAB (OUTPATIENT)
Dept: LAB | Facility: HOSPITAL | Age: 52
End: 2022-11-18

## 2022-11-18 ENCOUNTER — PRIOR AUTHORIZATION (OUTPATIENT)
Dept: ENDOCRINOLOGY | Facility: CLINIC | Age: 52
End: 2022-11-18

## 2022-11-18 ENCOUNTER — OFFICE VISIT (OUTPATIENT)
Dept: ENDOCRINOLOGY | Facility: CLINIC | Age: 52
End: 2022-11-18
Payer: COMMERCIAL

## 2022-11-18 VITALS
WEIGHT: 207.6 LBS | SYSTOLIC BLOOD PRESSURE: 120 MMHG | DIASTOLIC BLOOD PRESSURE: 78 MMHG | OXYGEN SATURATION: 96 % | HEIGHT: 66 IN | HEART RATE: 72 BPM | BODY MASS INDEX: 33.37 KG/M2

## 2022-11-18 DIAGNOSIS — E55.9 VITAMIN D DEFICIENCY: ICD-10-CM

## 2022-11-18 DIAGNOSIS — E11.9 TYPE 2 DIABETES MELLITUS WITHOUT COMPLICATION, WITHOUT LONG-TERM CURRENT USE OF INSULIN: Chronic | ICD-10-CM

## 2022-11-18 DIAGNOSIS — E03.9 ACQUIRED HYPOTHYROIDISM: ICD-10-CM

## 2022-11-18 DIAGNOSIS — R74.8 ELEVATED SERUM ALKALINE PHOSPHATASE LEVEL: ICD-10-CM

## 2022-11-18 DIAGNOSIS — E66.09 CLASS 1 OBESITY DUE TO EXCESS CALORIES WITH BODY MASS INDEX (BMI) OF 33.0 TO 33.9 IN ADULT, UNSPECIFIED WHETHER SERIOUS COMORBIDITY PRESENT: Chronic | ICD-10-CM

## 2022-11-18 DIAGNOSIS — E03.9 ACQUIRED HYPOTHYROIDISM: Primary | Chronic | ICD-10-CM

## 2022-11-18 DIAGNOSIS — M81.0 OSTEOPOROSIS WITHOUT CURRENT PATHOLOGICAL FRACTURE, UNSPECIFIED OSTEOPOROSIS TYPE: ICD-10-CM

## 2022-11-18 LAB
25(OH)D3 SERPL-MCNC: 47.2 NG/ML (ref 30–100)
ALBUMIN SERPL-MCNC: 4.2 G/DL (ref 3.5–5.2)
ALBUMIN/GLOB SERPL: 1.2 G/DL
ALP SERPL-CCNC: 133 U/L (ref 39–117)
ALT SERPL W P-5'-P-CCNC: 17 U/L (ref 1–33)
ANION GAP SERPL CALCULATED.3IONS-SCNC: 10.5 MMOL/L (ref 5–15)
AST SERPL-CCNC: 23 U/L (ref 1–32)
BILIRUB SERPL-MCNC: 0.7 MG/DL (ref 0–1.2)
BUN SERPL-MCNC: 10 MG/DL (ref 6–20)
BUN/CREAT SERPL: 12.7 (ref 7–25)
CALCIUM SPEC-SCNC: 9.7 MG/DL (ref 8.6–10.5)
CHLORIDE SERPL-SCNC: 103 MMOL/L (ref 98–107)
CO2 SERPL-SCNC: 26.5 MMOL/L (ref 22–29)
CREAT SERPL-MCNC: 0.79 MG/DL (ref 0.57–1)
EGFRCR SERPLBLD CKD-EPI 2021: 89 ML/MIN/1.73
EXPIRATION DATE: NORMAL
EXPIRATION DATE: NORMAL
GLOBULIN UR ELPH-MCNC: 3.5 GM/DL
GLUCOSE BLDC GLUCOMTR-MCNC: 82 MG/DL (ref 70–130)
GLUCOSE SERPL-MCNC: 82 MG/DL (ref 65–99)
HBA1C MFR BLD: 5.7 %
Lab: NORMAL
Lab: NORMAL
POTASSIUM SERPL-SCNC: 4.2 MMOL/L (ref 3.5–5.2)
PROT SERPL-MCNC: 7.7 G/DL (ref 6–8.5)
SODIUM SERPL-SCNC: 140 MMOL/L (ref 136–145)

## 2022-11-18 PROCEDURE — 84443 ASSAY THYROID STIM HORMONE: CPT

## 2022-11-18 PROCEDURE — 82306 VITAMIN D 25 HYDROXY: CPT

## 2022-11-18 PROCEDURE — 80053 COMPREHEN METABOLIC PANEL: CPT

## 2022-11-18 PROCEDURE — 83036 HEMOGLOBIN GLYCOSYLATED A1C: CPT | Performed by: PHYSICIAN ASSISTANT

## 2022-11-18 PROCEDURE — 82947 ASSAY GLUCOSE BLOOD QUANT: CPT | Performed by: PHYSICIAN ASSISTANT

## 2022-11-18 PROCEDURE — 99214 OFFICE O/P EST MOD 30 MIN: CPT | Performed by: PHYSICIAN ASSISTANT

## 2022-11-18 PROCEDURE — 83970 ASSAY OF PARATHORMONE: CPT

## 2022-11-18 RX ORDER — BLOOD SUGAR DIAGNOSTIC
STRIP MISCELLANEOUS
Qty: 100 EACH | Refills: 3 | Status: SHIPPED | OUTPATIENT
Start: 2022-11-18

## 2022-11-18 NOTE — TELEPHONE ENCOUNTER
ASHER HURST (Key: [id number])  Saxenda 18MG/3ML pen-injectors     Form  [facility] Electronic PA Form (2017 NCPDP)  Created  22 hours ago  Sent to Plan  8 hours ago  Plan Response  8 hours ago  Submit Clinical Questions  2 hours ago  Determination  Favorable  2 hours ago  Your prior authorization for Saxenda has been approved!

## 2022-11-19 LAB
CALCIUM SPEC-SCNC: 9.6 MG/DL (ref 8.6–10.5)
PTH-INTACT SERPL-MCNC: 41 PG/ML (ref 15–65)
TSH SERPL DL<=0.05 MIU/L-ACNC: 3.68 UIU/ML (ref 0.27–4.2)

## 2022-11-26 DIAGNOSIS — E66.09 CLASS 2 OBESITY DUE TO EXCESS CALORIES WITH BODY MASS INDEX (BMI) OF 37.0 TO 37.9 IN ADULT, UNSPECIFIED WHETHER SERIOUS COMORBIDITY PRESENT: Chronic | ICD-10-CM

## 2022-11-28 RX ORDER — LIRAGLUTIDE 6 MG/ML
3 INJECTION, SOLUTION SUBCUTANEOUS DAILY
Qty: 45 ML | Refills: 1 | Status: SHIPPED | OUTPATIENT
Start: 2022-11-28

## 2022-11-29 ENCOUNTER — TELEPHONE (OUTPATIENT)
Dept: ENDOCRINOLOGY | Facility: CLINIC | Age: 52
End: 2022-11-29

## 2022-11-29 DIAGNOSIS — E03.9 ACQUIRED HYPOTHYROIDISM: ICD-10-CM

## 2022-11-29 RX ORDER — LEVOTHYROXINE SODIUM 88 MCG
88 TABLET ORAL DAILY
Qty: 90 TABLET | Refills: 1 | Status: SHIPPED | OUTPATIENT
Start: 2022-11-29 | End: 2022-12-05 | Stop reason: SDUPTHER

## 2022-12-02 DIAGNOSIS — E03.9 ACQUIRED HYPOTHYROIDISM: ICD-10-CM

## 2022-12-02 RX ORDER — LEVOTHYROXINE SODIUM 88 MCG
88 TABLET ORAL DAILY
Qty: 90 TABLET | Refills: 1 | OUTPATIENT
Start: 2022-12-02

## 2022-12-02 NOTE — TELEPHONE ENCOUNTER
Destiny from [facility] pharmacy called.    When amanda sent synthroid prescription it said DAW1 which means to dispense brand name only. Brand name synthroid is too expensive.    Please change synthroid prescription so pt can  generic.     Synthroid 88mcg daily      [facility] [place]  Phone: [phone] Fax: [phone]

## 2022-12-02 NOTE — TELEPHONE ENCOUNTER
Duplicate request RX E-scripted   Synthroid 88 MCG tablet [[id number]]     Order Details  Dose: 88 mcg Route: Oral Frequency: Daily   Dispense Quantity: 90 tablet Refills: 1          Sig: Take 1 tablet by mouth Daily.         Start Date: 11/29/22 End Date: --   Written Date: 11/29/22 Expiration Date: 11/29/23       Diagnosis Association: Acquired hypothyroidism (E03.9)   Original Order:  Synthroid 88 MCG tablet [[id number]]   Providers    Ordering Provider and Authorizing Provider:    Brianne Rincon PA   [address] [place] [id number]   Phone:  [phone]   Fax:  [phone]   NPI:  [id number]        Ordering User:  Brianne Rincon PA          Pharmacy    [facility] [id number] [facility] [address] [facility] [address] [place] [address] - [phone]  - [phone]    [address] [facility] [address] [address]   Phone:  [phone]  Fax:  [phone]     Orders with any of the following pharmaceutical classes: Thyroid    Name Dose Frequency Start Date End Date Medication Warnings Interventions? Order Mode    Synthroid 88 MCG tablet 88 mcg Daily 05/23/22 11/29/22   Outpatient      Warnings Override History    No Interaction Warnings Shown      Pharmacist Clinical Review History    This prescription has not been clinically reviewed.     Order Reconciliation Actions       Order Reconciliation Actions     E-Prescribing Status    Outpatient Medication Detail    Synthroid 88 MCG tablet        Sig: Take 1 tablet by mouth Daily.        Sent to pharmacy as: Synthroid 88 MCG Oral Tablet        Class: Normal        Route: Oral        E-Prescribing Status: Receipt confirmed by pharmacy (11/29/2022  7:09 PM EST)

## 2022-12-02 NOTE — TELEPHONE ENCOUNTER
PATIENT IS CALLING WANTING A PRESCRIPTION FOR GENERIC LEVOTHYROXINE. THE CURRENT PRESCRIPTION IS FOR BRAND NAME. SHE WANTS GENERIC OPTION. SHE IS OUT OF MEDICATION STARTING TOMORROW. PATIENTS STATES THE GENERIC IS CHEAPER THEN THE BRAND NAME. PATIENTS  NUMBER [phone]

## 2022-12-05 RX ORDER — LEVOTHYROXINE SODIUM 88 UG/1
88 TABLET ORAL DAILY
Qty: 90 TABLET | Refills: 1 | Status: SHIPPED | OUTPATIENT
Start: 2022-12-05 | End: 2022-12-06 | Stop reason: SDUPTHER

## 2022-12-05 NOTE — TELEPHONE ENCOUNTER
[facility] CALLED STATING THIS PT IS INTERESTED IN GETTING LEVOTHYROXINE OR GENERIC OF SYNTHROID RX. PHARM REQUESTED CHANGE PER PT.

## 2022-12-06 ENCOUNTER — TELEPHONE (OUTPATIENT)
Dept: ENDOCRINOLOGY | Facility: CLINIC | Age: 52
End: 2022-12-06

## 2022-12-06 DIAGNOSIS — E03.9 ACQUIRED HYPOTHYROIDISM: ICD-10-CM

## 2022-12-06 RX ORDER — LEVOTHYROXINE SODIUM 88 UG/1
88 TABLET ORAL DAILY
Qty: 90 TABLET | Refills: 1 | Status: SHIPPED | OUTPATIENT
Start: 2022-12-06

## 2022-12-06 NOTE — TELEPHONE ENCOUNTER
damaso called from [facility] pharmacy in [place]. We sent in synthroid name brand -[email or web address]-- Pt would like the generic.    Please send generic synthroid 88mcg daily script to     [facility]  Phone: [phone] Fax: [phone]

## 2023-01-04 DIAGNOSIS — E66.09 CLASS 2 OBESITY DUE TO EXCESS CALORIES WITH BODY MASS INDEX (BMI) OF 39.0 TO 39.9 IN ADULT, UNSPECIFIED WHETHER SERIOUS COMORBIDITY PRESENT: ICD-10-CM

## 2023-01-05 RX ORDER — PEN NEEDLE, DIABETIC 32GX 5/32"
NEEDLE, DISPOSABLE MISCELLANEOUS
Qty: 50 EACH | Refills: 5 | Status: SHIPPED | OUTPATIENT
Start: 2023-01-05

## 2023-04-12 ENCOUNTER — OFFICE VISIT (OUTPATIENT)
Dept: CARDIOLOGY | Facility: CLINIC | Age: 53
End: 2023-04-12
Payer: COMMERCIAL

## 2023-04-12 VITALS
OXYGEN SATURATION: 97 % | HEIGHT: 66 IN | DIASTOLIC BLOOD PRESSURE: 78 MMHG | BODY MASS INDEX: 36.1 KG/M2 | HEART RATE: 81 BPM | WEIGHT: 224.6 LBS | SYSTOLIC BLOOD PRESSURE: 118 MMHG

## 2023-04-12 DIAGNOSIS — G47.33 OSA (OBSTRUCTIVE SLEEP APNEA): Primary | ICD-10-CM

## 2023-04-12 RX ORDER — NYSTATIN 100000 [USP'U]/G
POWDER TOPICAL
COMMUNITY
Start: 2023-03-02 | End: 2023-04-12

## 2023-04-12 NOTE — PROGRESS NOTES
Follow-Up Sleep Consult     Date:   2023  Name: Tereza Eid  :   1970  PCP: Fawad Bourne MD    Chief Complaint   Patient presents with   • Sleep Apnea       Subjective     History of Present Illness  Tereza Eid is a 52 y.o. female who presents today for follow-up on PRACHI.  She comes in today for 31 to 90-day follow-up visit on a new CPAP.  She reports she has been a faithful user of PAP therapy for years.  At her last visit orders were sent for a replacement PAP as her PAP was very old.    She reports that her airflow is comfortable.  She reports air leak on her mask.  She reports that she is using a fullface mask with memory foam but she wishes to have a trial of a nasal mask.        No specialty comments available.    History of PRACHI with a baseline AHI of 5 on a study 2017.       Current Treatment: Auto CPAP 8 to 16 cm on a ResMed device.      Device Download  AHI on download is 0.3.  Compliance on download is 100%.  When used >4 hours is 93%.   Average use per night is 7 hours and 44 minutes.    Current mask used is fullface mask with memory foam.    The patient's relevant past medical, surgical, family, and social history reviewed and updated in Epic as appropriate.    Past Medical History:   Diagnosis Date   • Anxiety    • Chronic constipation    • Cobalamin deficiency    • Depression    • Dyspepsia    • Dyspnea on exertion    • Fatigue    • H. pylori infection     treated x 2, remotely   • Hashimoto's thyroiditis    • Heart murmur    • Heartburn     prn TUMS   • Hyperlipidemia    • Hypoglycemia    • Hypothyroidism    • Joint pain     (R) knee, (R) hip   • Menopause    • Morbid obesity    • Non-toxic multinodular goiter    • Obesity    • Osteoporosis     weekly Fosamax [phone]   • PAC (premature atrial contraction)     w/ tachycardia, [place] [place] other [place] issues   • Peripheral venous insufficiency 2019   • Prediabetes     A1c 5.9   •  Sleep apnea     CPAP compliant   • Thyroid goiter     follows w/ endocrinology, on Synthroid, episodic dysphagia   • Vitamin D deficiency    • Well controlled type 2 diabetes mellitus      Past Surgical History:   Procedure Laterality Date   • APPENDECTOMY  1989    during expl.lap   • CARDIAC CATHETERIZATION     • COLONOSCOPY  08/06/2019    unremarkable   • EXPLORATORY LAPAROTOMY  1989    for miscarriage, concomitant appy   • GASTRIC SLEEVE LAPAROSCOPIC  12/2011    w/ Dr. Giron   • LAPAROSCOPIC CHOLECYSTECTOMY  2001    for stones     OB History    No obstetric history on file.       Allergies   Allergen Reactions   • Aleve [Naproxen] Palpitations   • Codeine Palpitations and Other (See Comments)   • Escitalopram Palpitations   • Lexapro [Escitalopram Oxalate] Palpitations     Prior to Admission medications    Medication Sig Start Date End Date Taking? Authorizing Provider   atorvastatin (LIPITOR) 40 MG tablet Take 1 tablet by mouth Daily. 7/28/19  Yes Gabrielle Quezada MD   BD Pen Needle Margareth 2nd Gen 32G X 4 MM misc USE ONE - DAILY 1/5/23  Yes Brianne Rincon PA   cyanocobalamin (VITAMIN B-12) 500 MCG tablet Take 1 tablet by mouth Daily. 6/2/21  Yes Brianne Rincon PA   levothyroxine (Synthroid) 88 MCG tablet Take 1 tablet by mouth Daily. 12/6/22  Yes Brianne Rincon PA   multivitamin with minerals tablet tablet Take 1 tablet by mouth Daily.   Yes Provider, Historical, MD   OneTouch Delica Lancets 33G misc Testing 1x per day; E11.9 1/31/22  Yes Brianne Rincon PA   OneTouch Verio test strip Testing once per day; E11.9 11/18/22  Yes Brianne Rincon PA   Saxenda 18 MG/3ML injection pen INJECT 3 MG UNDER THE SKIN INTO THE APPROPRIATE AREA AS DIRECTED DAILY 11/28/22  Yes Brianne Rincon PA   cyanocobalamin 1000 MCG/ML injection Inject 1,000 mcg into the appropriate muscle as directed by prescriber Every 30 (Thirty) Days.  4/12/23  Gabrielle Quezada MD  "  nystatin [id number] UNIT/GM topical powder  3/2/23 4/12/23  ProviderGabrielle MD   Probiotic Product (PROBIOTIC PO) Take 1 tablet by mouth As Needed.  4/12/23  Provider, MD Gabrielle     Family History   Problem Relation Age of Onset   • COPD Mother    • Heart attack Mother    • Obesity Mother    • Sleep apnea Mother    • Arrhythmia Mother    • Heart failure Mother    • Kidney failure Father    • Kidney disease Father    • Obesity Sister    • Hyperlipidemia Brother    • Hyperlipidemia Sister    • Obesity Sister        Objective     Vital Signs:  /78   Pulse 81   Ht 167.6 cm (66\")   Wt 102 kg (224 lb 9.6 oz)   SpO2 97%   BMI 36.25 kg/m²     Class 2 Severe Obesity (BMI >=35 and <=39.9). Obesity-related health conditions include the following: obstructive sleep apnea. Obesity is unchanged. BMI is is above average; BMI management plan is completed. We discussed portion control and Follow-up with her primary care provider.        Physical Exam  Constitutional:       Appearance: Normal appearance.   HENT:      Head: Normocephalic.   Pulmonary:      Effort: Pulmonary effort is normal.   Musculoskeletal:         General: Normal range of motion.      Cervical back: Neck supple.   Neurological:      Mental Status: She is alert and oriented to person, place, and time.   Psychiatric:         Mood and Affect: Mood normal.         Behavior: Behavior normal.         Thought Content: Thought content normal.         The following data was reviewed by: TAMMY Roque on 04/12/2023:    PAP download reviewed: CPAP download dated March 11 through April 7, 2023.  This is a 30-day download.  I have reviewed and interpreted the download in clinic today.         Assessment and Plan     Diagnoses and all orders for this visit:    1. PRACHI (obstructive sleep apnea) (Primary)  Assessment & Plan:  Baseline AHI 5.  This is mild sleep apnea.  She is on CPAP therapy.  Download is reviewed with good control and good " compliance.  She is benefiting from PAP therapy and we plan to continue PAP therapy.    Prescription for supply order to the DME of her choice.  She wishes to change from a fullface mask to a nasal mask.  We discussed AirTouch in 20 nasal mask with memory foam.    She is advised if she prefers a fullface mask then she should call back to the office to have a new prescription sent to DME.     Follow-up in 1 year or sooner for any problems or complaints.    Orders:  -     PAP Therapy      Report if any new/changing symptoms immediately         Follow Up  Return in about 1 year (around 4/12/2024) for RPACHI.  Patient was given instructions and counseling regarding her condition or for health maintenance advice. Please see specific information pulled into the AVS if appropriate.

## 2023-04-12 NOTE — ASSESSMENT & PLAN NOTE
Baseline AHI 5.  This is mild sleep apnea.  She is on CPAP therapy.  Download is reviewed with good control and good compliance.  She is benefiting from PAP therapy and we plan to continue PAP therapy.    Prescription for supply order to the DME of her choice.  She wishes to change from a fullface mask to a nasal mask.  We discussed AirTouch in 20 nasal mask with memory foam.    She is advised if she prefers a fullface mask then she should call back to the office to have a new prescription sent to DME.     Follow-up in 1 year or sooner for any problems or complaints.

## 2023-04-18 ENCOUNTER — HOSPITAL ENCOUNTER (EMERGENCY)
Facility: HOSPITAL | Age: 53
Discharge: HOME | End: 2023-04-18
Attending: EMERGENCY MEDICINE | Admitting: EMERGENCY MEDICINE
Payer: COMMERCIAL

## 2023-04-18 ENCOUNTER — APPOINTMENT (OUTPATIENT)
Dept: CT IMAGING | Facility: HOSPITAL | Age: 53
End: 2023-04-18
Payer: COMMERCIAL

## 2023-04-18 VITALS
RESPIRATION RATE: 18 BRPM | HEART RATE: 81 BPM | TEMPERATURE: 98.6 F | OXYGEN SATURATION: 98 % | DIASTOLIC BLOOD PRESSURE: 88 MMHG | SYSTOLIC BLOOD PRESSURE: 137 MMHG | WEIGHT: 218 LBS | HEIGHT: 66 IN | BODY MASS INDEX: 35.03 KG/M2

## 2023-04-18 DIAGNOSIS — Z90.3 H/O GASTRIC SLEEVE: ICD-10-CM

## 2023-04-18 DIAGNOSIS — R10.84 GENERALIZED ABDOMINAL PAIN: Primary | ICD-10-CM

## 2023-04-18 LAB
ALBUMIN SERPL-MCNC: 4.3 G/DL (ref 3.5–5.2)
ALBUMIN/GLOB SERPL: 1.3 G/DL
ALP SERPL-CCNC: 130 U/L (ref 39–117)
ALT SERPL W P-5'-P-CCNC: 18 U/L (ref 1–33)
ANION GAP SERPL CALCULATED.3IONS-SCNC: 11 MMOL/L (ref 5–15)
AST SERPL-CCNC: 23 U/L (ref 1–32)
BASOPHILS # BLD AUTO: 0.02 10*3/MM3 (ref 0–0.2)
BASOPHILS NFR BLD AUTO: 0.3 % (ref 0–1.5)
BILIRUB SERPL-MCNC: 0.6 MG/DL (ref 0–1.2)
BILIRUB UR QL STRIP: NEGATIVE
BUN SERPL-MCNC: 11 MG/DL (ref 6–20)
BUN/CREAT SERPL: 17.2 (ref 7–25)
CALCIUM SPEC-SCNC: 9.1 MG/DL (ref 8.6–10.5)
CHLORIDE SERPL-SCNC: 107 MMOL/L (ref 98–107)
CLARITY UR: CLEAR
CO2 SERPL-SCNC: 26 MMOL/L (ref 22–29)
COLOR UR: YELLOW
CREAT SERPL-MCNC: 0.64 MG/DL (ref 0.57–1)
D-LACTATE SERPL-SCNC: 1.1 MMOL/L (ref 0.5–2)
DEPRECATED RDW RBC AUTO: 43.4 FL (ref 37–54)
EGFRCR SERPLBLD CKD-EPI 2021: 105.2 ML/MIN/1.73
EOSINOPHIL # BLD AUTO: 0.1 10*3/MM3 (ref 0–0.4)
EOSINOPHIL NFR BLD AUTO: 1.5 % (ref 0.3–6.2)
ERYTHROCYTE [DISTWIDTH] IN BLOOD BY AUTOMATED COUNT: 13.2 % (ref 12.3–15.4)
GLOBULIN UR ELPH-MCNC: 3.4 GM/DL
GLUCOSE SERPL-MCNC: 103 MG/DL (ref 65–99)
GLUCOSE UR STRIP-MCNC: NEGATIVE MG/DL
HCT VFR BLD AUTO: 38.6 % (ref 34–46.6)
HGB BLD-MCNC: 12.1 G/DL (ref 12–15.9)
HGB UR QL STRIP.AUTO: NEGATIVE
HOLD SPECIMEN: NORMAL
IMM GRANULOCYTES # BLD AUTO: 0.02 10*3/MM3 (ref 0–0.05)
IMM GRANULOCYTES NFR BLD AUTO: 0.3 % (ref 0–0.5)
KETONES UR QL STRIP: NEGATIVE
LEUKOCYTE ESTERASE UR QL STRIP.AUTO: NEGATIVE
LIPASE SERPL-CCNC: 27 U/L (ref 13–60)
LYMPHOCYTES # BLD AUTO: 1.54 10*3/MM3 (ref 0.7–3.1)
LYMPHOCYTES NFR BLD AUTO: 23.8 % (ref 19.6–45.3)
MCH RBC QN AUTO: 27.9 PG (ref 26.6–33)
MCHC RBC AUTO-ENTMCNC: 31.3 G/DL (ref 31.5–35.7)
MCV RBC AUTO: 88.9 FL (ref 79–97)
MONOCYTES # BLD AUTO: 0.65 10*3/MM3 (ref 0.1–0.9)
MONOCYTES NFR BLD AUTO: 10.1 % (ref 5–12)
NEUTROPHILS NFR BLD AUTO: 4.13 10*3/MM3 (ref 1.7–7)
NEUTROPHILS NFR BLD AUTO: 64 % (ref 42.7–76)
NITRITE UR QL STRIP: NEGATIVE
NRBC BLD AUTO-RTO: 0 /100 WBC (ref 0–0.2)
PH UR STRIP.AUTO: 6.5 [PH] (ref 5–8)
PLATELET # BLD AUTO: 397 10*3/MM3 (ref 140–450)
PMV BLD AUTO: 9.5 FL (ref 6–12)
POTASSIUM SERPL-SCNC: 3.9 MMOL/L (ref 3.5–5.2)
PROT SERPL-MCNC: 7.7 G/DL (ref 6–8.5)
PROT UR QL STRIP: NEGATIVE
RBC # BLD AUTO: 4.34 10*6/MM3 (ref 3.77–5.28)
SODIUM SERPL-SCNC: 144 MMOL/L (ref 136–145)
SP GR UR STRIP: 1.02 (ref 1–1.03)
UROBILINOGEN UR QL STRIP: NORMAL
WBC NRBC COR # BLD: 6.46 10*3/MM3 (ref 3.4–10.8)
WHOLE BLOOD HOLD COAG: NORMAL
WHOLE BLOOD HOLD SPECIMEN: NORMAL

## 2023-04-18 PROCEDURE — 83690 ASSAY OF LIPASE: CPT | Performed by: EMERGENCY MEDICINE

## 2023-04-18 PROCEDURE — 36415 COLL VENOUS BLD VENIPUNCTURE: CPT

## 2023-04-18 PROCEDURE — 99283 EMERGENCY DEPT VISIT LOW MDM: CPT

## 2023-04-18 PROCEDURE — 80053 COMPREHEN METABOLIC PANEL: CPT | Performed by: EMERGENCY MEDICINE

## 2023-04-18 PROCEDURE — 74177 CT ABD & PELVIS W/CONTRAST: CPT

## 2023-04-18 PROCEDURE — 83605 ASSAY OF LACTIC ACID: CPT | Performed by: EMERGENCY MEDICINE

## 2023-04-18 PROCEDURE — 81003 URINALYSIS AUTO W/O SCOPE: CPT | Performed by: EMERGENCY MEDICINE

## 2023-04-18 PROCEDURE — 85025 COMPLETE CBC W/AUTO DIFF WBC: CPT | Performed by: EMERGENCY MEDICINE

## 2023-04-18 PROCEDURE — 25510000001 IOPAMIDOL 61 % SOLUTION: Performed by: EMERGENCY MEDICINE

## 2023-04-18 RX ORDER — SODIUM CHLORIDE 9 MG/ML
10 INJECTION INTRAVENOUS AS NEEDED
Status: DISCONTINUED | OUTPATIENT
Start: 2023-04-18 | End: 2023-04-18 | Stop reason: HOSPADM

## 2023-04-18 RX ADMIN — IOPAMIDOL 95 ML: 612 INJECTION, SOLUTION INTRAVENOUS at 08:52

## 2023-04-18 NOTE — ED PROVIDER NOTES
Subjective   History of Present Illness  Pt is a 53 yo female presenting to ED with complaints of abdominal pain. PMHx significant for DM, HLD, Hypothyroidism, Depression and PRACHI. Pt reports waking up this morning with severe mid abdominal pain that wrapped around to right flank. Pain has subsided but still presenting. She denies N/V/D or urinary sx. She denies fever, chills, CP, SOB or cough. She denies syncope or dizziness. LBM 4 days ago. She denies recent antibiotics or new meds. Her prior abdominal surigcal hx includes gastric sleeve 2011, cholecystectomy and appendectomy. She denies tobacco, drug or ETOH use. She took no meds PTA.     History provided by:  Medical records and patient      Review of Systems   Constitutional: Negative for chills and fever.   HENT: Negative for congestion and trouble swallowing.    Eyes: Negative for visual disturbance.   Respiratory: Negative for cough and shortness of breath.    Cardiovascular: Negative for chest pain and leg swelling.   Gastrointestinal: Positive for abdominal pain. Negative for constipation, diarrhea, nausea and vomiting.   Genitourinary: Negative for difficulty urinating, dysuria, flank pain and hematuria.   Musculoskeletal: Negative for arthralgias and back pain.   Skin: Negative for rash and wound.   Neurological: Negative for dizziness, syncope, speech difficulty, weakness, numbness and headaches.   Psychiatric/Behavioral: Negative for confusion.   All other systems reviewed and are negative.      Past Medical History:   Diagnosis Date   • Anxiety    • Chronic constipation    • Cobalamin deficiency    • Depression    • Dyspepsia    • Dyspnea on exertion    • Fatigue    • H. pylori infection     treated x 2, remotely   • Hashimoto's thyroiditis    • Heart murmur    • Heartburn     prn TUMS   • Hyperlipidemia    • Hypoglycemia    • Hypothyroidism    • Joint pain     (R) knee, (R) hip   • Menopause    • Morbid obesity    • Non-toxic multinodular goiter    •  Obesity    • Osteoporosis     weekly Fosamax [id number]   • PAC (premature atrial contraction)     w/ tachycardia, follows w/ Dr. Green @[facility] - no other cardiac issues   • Peripheral venous insufficiency 2019   • Prediabetes     A1c 5.9   • Sleep apnea     CPAP compliant   • Thyroid goiter     follows w/ endocrinology, on Synthroid, episodic dysphagia   • Vitamin D deficiency    • Well controlled type 2 diabetes mellitus        Allergies   Allergen Reactions   • Aleve [Naproxen] Palpitations   • Codeine Palpitations and Other (See Comments)   • Escitalopram Palpitations   • Lexapro [Escitalopram Oxalate] Palpitations       Past Surgical History:   Procedure Laterality Date   • APPENDECTOMY      during expl.lap   • CARDIAC CATHETERIZATION     • COLONOSCOPY  2019    unremarkable   • EXPLORATORY LAPAROTOMY      for miscarriage, concomitant appy   • GASTRIC SLEEVE LAPAROSCOPIC  2011    w/ Dr. Giron   • LAPAROSCOPIC CHOLECYSTECTOMY      for stones       Family History   Problem Relation Age of Onset   • COPD Mother    • Heart attack Mother    • Obesity Mother    • Sleep apnea Mother    • Arrhythmia Mother    • Heart failure Mother    • Kidney failure Father    • Kidney disease Father    • Obesity Sister    • Hyperlipidemia Brother    • Hyperlipidemia Sister    • Obesity Sister        Social History     Socioeconomic History   • Marital status:    Tobacco Use   • Smoking status: Former     Packs/day: 1.00     Years: 8.00     Pack years: 8.00     Types: Cigarettes     Quit date: 1990     Years since quittin.3   • Smokeless tobacco: Never   Vaping Use   • Vaping Use: Never used   Substance and Sexual Activity   • Alcohol use: No   • Drug use: No   • Sexual activity: Yes     Partners: Male     Birth control/protection: Post-menopausal, None           Objective   Physical Exam  Vitals and nursing note reviewed.   Constitutional:       Appearance: She is well-developed. She is obese.    HENT:      Head: Atraumatic.      Nose: Nose normal.   Eyes:      General: Lids are normal.      Conjunctiva/sclera: Conjunctivae normal.      Pupils: Pupils are equal, round, and reactive to light.   Cardiovascular:      Rate and Rhythm: Normal rate and regular rhythm.      Heart sounds: Normal heart sounds.   Pulmonary:      Effort: Pulmonary effort is normal.      Breath sounds: Normal breath sounds. No wheezing.   Abdominal:      General: There is no distension.      Palpations: Abdomen is soft.      Tenderness: There is abdominal tenderness in the right lower quadrant, epigastric area and periumbilical area. There is no right CVA tenderness, left CVA tenderness, guarding or rebound.   Musculoskeletal:         General: No tenderness. Normal range of motion.      Cervical back: Normal range of motion and neck supple.   Skin:     General: Skin is warm and dry.      Findings: No erythema or rash.   Neurological:      Mental Status: She is alert and oriented to person, place, and time.      Sensory: No sensory deficit.   Psychiatric:         Speech: Speech normal.         Behavior: Behavior normal.         Procedures           ED Course  ED Course as of 04/18/23 1743 Tue Apr 18, 2023   0907 WBC: 6.46 [RT]   0907 Lactate: 1.1 [RT]      ED Course User Index  [RT] Kathryn Bettencourt, PA      Recent Results (from the past 24 hour(s))   Comprehensive Metabolic Panel    Collection Time: 04/18/23  7:38 AM    Specimen: Blood   Result Value Ref Range    Glucose 103 (H) 65 - 99 mg/dL    BUN 11 6 - 20 mg/dL    Creatinine 0.64 0.57 - 1.00 mg/dL    Sodium 144 136 - 145 mmol/L    Potassium 3.9 3.5 - 5.2 mmol/L    Chloride 107 98 - 107 mmol/L    CO2 26.0 22.0 - 29.0 mmol/L    Calcium 9.1 8.6 - 10.5 mg/dL    Total Protein 7.7 6.0 - 8.5 g/dL    Albumin 4.3 3.5 - 5.2 g/dL    ALT (SGPT) 18 1 - 33 U/L    AST (SGOT) 23 1 - 32 U/L    Alkaline Phosphatase 130 (H) 39 - 117 U/L    Total Bilirubin 0.6 0.0 - 1.2 mg/dL    Globulin 3.4 gm/dL     A/G Ratio 1.3 g/dL    BUN/Creatinine Ratio 17.2 7.0 - 25.0    Anion Gap 11.0 5.0 - 15.0 mmol/L    eGFR 105.2 >60.0 mL/min/1.73   Lipase    Collection Time: 04/18/23  7:38 AM    Specimen: Blood   Result Value Ref Range    Lipase 27 13 - 60 U/L   Lactic Acid, Plasma    Collection Time: 04/18/23  7:38 AM    Specimen: Blood   Result Value Ref Range    Lactate 1.1 0.5 - 2.0 mmol/L   Green Top (Gel)    Collection Time: 04/18/23  7:38 AM   Result Value Ref Range    Extra Tube Hold for add-ons.    Lavender Top    Collection Time: 04/18/23  7:38 AM   Result Value Ref Range    Extra Tube hold for add-on    Gold Top - SST    Collection Time: 04/18/23  7:38 AM   Result Value Ref Range    Extra Tube Hold for add-ons.    Gray Top    Collection Time: 04/18/23  7:38 AM   Result Value Ref Range    Extra Tube Hold for add-ons.    Light Blue Top    Collection Time: 04/18/23  7:38 AM   Result Value Ref Range    Extra Tube Hold for add-ons.    CBC Auto Differential    Collection Time: 04/18/23  7:38 AM    Specimen: Blood   Result Value Ref Range    WBC 6.46 3.40 - 10.80 10*3/mm3    RBC 4.34 3.77 - 5.28 10*6/mm3    Hemoglobin 12.1 12.0 - 15.9 g/dL    Hematocrit 38.6 34.0 - 46.6 %    MCV 88.9 79.0 - 97.0 fL    MCH 27.9 26.6 - 33.0 pg    MCHC 31.3 (L) 31.5 - 35.7 g/dL    RDW 13.2 12.3 - 15.4 %    RDW-SD 43.4 37.0 - 54.0 fl    MPV 9.5 6.0 - 12.0 fL    Platelets 397 140 - 450 10*3/mm3    Neutrophil % 64.0 42.7 - 76.0 %    Lymphocyte % 23.8 19.6 - 45.3 %    Monocyte % 10.1 5.0 - 12.0 %    Eosinophil % 1.5 0.3 - 6.2 %    Basophil % 0.3 0.0 - 1.5 %    Immature Grans % 0.3 0.0 - 0.5 %    Neutrophils, Absolute 4.13 1.70 - 7.00 10*3/mm3    Lymphocytes, Absolute 1.54 0.70 - 3.10 10*3/mm3    Monocytes, Absolute 0.65 0.10 - 0.90 10*3/mm3    Eosinophils, Absolute 0.10 0.00 - 0.40 10*3/mm3    Basophils, Absolute 0.02 0.00 - 0.20 10*3/mm3    Immature Grans, Absolute 0.02 0.00 - 0.05 10*3/mm3    nRBC 0.0 0.0 - 0.2 /100 WBC   Urinalysis With  "Microscopic If Indicated (No Culture) - Urine, Clean Catch    Collection Time: 04/18/23  7:39 AM    Specimen: Urine, Clean Catch   Result Value Ref Range    Color, UA Yellow Yellow, Straw    Appearance, UA Clear Clear    pH, UA 6.5 5.0 - 8.0    Specific Gravity, UA 1.021 1.001 - 1.030    Glucose, UA Negative Negative    Ketones, UA Negative Negative    Bilirubin, UA Negative Negative    Blood, UA Negative Negative    Protein, UA Negative Negative    Leuk Esterase, UA Negative Negative    Nitrite, UA Negative Negative    Urobilinogen, UA 1.0 E.U./dL 0.2 - 1.0 E.U./dL     Note: In addition to lab results from this visit, the labs listed above may include labs taken at another facility or during a different encounter within the last 24 hours. Please correlate lab times with ED admission and discharge times for further clarification of the services performed during this visit.    CT Abdomen Pelvis With Contrast   Final Result      1. No acute process demonstrated   2. Colonic diverticulosis   3. Status post cholecystectomy, sleeve gastrectomy, and appendectomy               Electronically Signed: Devyn Francois     4/18/2023 9:03 AM EDT     Workstation ID: [id number]        Vitals:    04/18/23 0727   BP: 137/88   BP Location: Left arm   Patient Position: Sitting   Pulse: 81   Resp: 18   Temp: 98.6 °F (37 °C)   TempSrc: Oral   SpO2: 98%   Weight: 98.9 kg (218 lb)   Height: 167.6 cm (66\")     Medications   iopamidol (ISOVUE-300) 61 % injection 95 mL (95 mL Intravenous Given 4/18/23 0852)     ECG/EMG Results (last 24 hours)     ** No results found for the last 24 hours. **        No orders to display                                            Medical Decision Making  Pt is a 53 yo female presenting to ED with complaints of abdominal pain that began this morning and has improved. Labs and CT abd/pelvis without emergent findings. She is feeling better in ED. Discussed results and tx plan. Will dc home and she will f/u with PCP. " She will return to ED if new / worse sx.     DDx  SBO, Gastric sleeve complication, UTI, Dehydration, SHARON, pancreatitis, Kidney stone     Generalized abdominal pain: acute illness or injury  H/O gastric sleeve: acute illness or injury  Amount and/or Complexity of Data Reviewed  Independent Historian: spouse  Labs: ordered. Decision-making details documented in ED Course.  Radiology: ordered. Decision-making details documented in ED Course.      Risk  Prescription drug management.          Final diagnoses:   Generalized abdominal pain   H/O gastric sleeve       ED Disposition  ED Disposition     ED Disposition   Discharge    Condition   Stable    Comment   --             Fawad Bourne MD  [address]   [place] [address] [phone]    Schedule an appointment as soon as possible for a visit       [facility] Emergency Department  [address] [address] [phone]    If symptoms worsen         Medication List      No changes were made to your prescriptions during this visit.          Kathryn Bettencourt PA  04/18/23 3512

## 2023-05-17 ENCOUNTER — OFFICE VISIT (OUTPATIENT)
Dept: CARDIOLOGY | Facility: CLINIC | Age: 53
End: 2023-05-17
Payer: COMMERCIAL

## 2023-05-17 VITALS
WEIGHT: 219 LBS | HEIGHT: 66 IN | DIASTOLIC BLOOD PRESSURE: 76 MMHG | HEART RATE: 78 BPM | OXYGEN SATURATION: 98 % | SYSTOLIC BLOOD PRESSURE: 120 MMHG | BODY MASS INDEX: 35.2 KG/M2

## 2023-05-17 DIAGNOSIS — I49.1 PAC (PREMATURE ATRIAL CONTRACTION): Primary | ICD-10-CM

## 2023-05-17 DIAGNOSIS — G47.33 OSA (OBSTRUCTIVE SLEEP APNEA): ICD-10-CM

## 2023-05-17 NOTE — PROGRESS NOTES
Tereza Eid  1970  [phone]    05/17/2023    [facility]     Referring Provider: No ref. provider found     Fawad Bourne MD  [address] [place] [address]    Chief Complaint   Patient presents with   • PAC (premature atrial contraction)       Problem List:     1. PAC's/NSAT  a. Longstanding history of PAC's/palpitations  b. CHADSVASc = 2, initiated on Xarelto  c. Echocardiogram 2/6/2019: EF 70%, mild MR/TR  d. Event monitor 5/21/2019: Predominantly normal sinus rhythm, PACs, questionable AF more consistent with PAC's/NSAT  e. ER visit 07/2019 with tachy-palpitations, work up unremarkable  2. SOB  a. Heart cath Dr Green 1/29/2019 LVEF NL, NL cors  3. Hyperlipidemia  4. Hypothyroidism  5. PRACHI, on CPAP  6. GERD  7. Anxiety  8. Surgical history  a. Appendectomy  b. Cholecystectomy  c. Gastrectomy sleeve      Allergies  Allergies   Allergen Reactions   • Aleve [Naproxen] Palpitations   • Codeine Palpitations and Other (See Comments)   • Escitalopram Palpitations   • Lexapro [Escitalopram Oxalate] Palpitations       Current Medications    Current Outpatient Medications:   •  atorvastatin (LIPITOR) 40 MG tablet, Take 1 tablet by mouth Daily., Disp: , Rfl:   •  BD Pen Needle Margareth 2nd Gen 32G X 4 MM misc, USE ONE - DAILY, Disp: 50 each, Rfl: 5  •  cyanocobalamin (VITAMIN B-12) 500 MCG tablet, Take 1 tablet by mouth Daily., Disp: , Rfl:   •  levothyroxine (Synthroid) 88 MCG tablet, Take 1 tablet by mouth Daily., Disp: 90 tablet, Rfl: 1  •  OneTouch Delica Lancets 33G misc, Testing 1x per day; E11.9, Disp: 50 each, Rfl: 11  •  OneTouch Verio test strip, Testing once per day; E11.9, Disp: 100 each, Rfl: 3  •  Saxenda 18 MG/3ML injection pen, INJECT 3 MG UNDER THE SKIN INTO THE APPROPRIATE AREA AS DIRECTED DAILY, Disp: 45 mL, Rfl: 1    History of Present Illness     Pt presents for follow up of PAC/NSAT. Since we last saw the pt, pt denies any sustained  "episodes, SOB, CP, LH, and dizziness. Denies any hospitalizations, ER visits, bleeding, or TIA/CVA symptoms. Overall feels well. Wore two week monitor for palps. No change in PAC burden per patient. Thyroid stable.     ROS:  General:  Denies fatigue, weight gain or loss  Cardiovascular:  Denies CP, PND, syncope, near syncope, edema + palpitations.  Pulmonary:  Denies ROTH, cough, or wheezing      Vitals:    05/17/23 1018   BP: 120/76   BP Location: Left arm   Patient Position: Sitting   Pulse: 78   SpO2: 98%   Weight: 99.3 kg (219 lb)   Height: 167.6 cm (66\")     Body mass index is 35.35 kg/m².  PE:  General: NAD  Neck: no JVD, no carotid bruits, no TM  Heart RRR, NL S1, S2, S4 present, no rubs, murmurs  Lungs: CTA, no wheezes, rhonchi, or rales  Abd: soft, non-tender, NL BS  Ext: No musculoskeletal deformities, no edema, cyanosis, or clubbing  Psych: normal mood and affect    Diagnostic Data:        ECG 12 Lead    Date/Time: 5/17/2023 10:35 AM  Performed by: Christoph Lou MD  Authorized by: Christoph Lou MD   Comparison: compared with previous ECG from 10/16/2019  Similar to previous ECG  Rhythm: sinus rhythm  BPM: 78                     1. PAC (premature atrial contraction)    2. PRACHI (obstructive sleep apnea)          Plan:  1. PAC/NSAT:   - doing well off medications. will follow up on two week ziopatch     2. PRACHI: on CPAP in past; awaiting possible nasal mask      F/up in 12 months      "

## 2023-05-19 ENCOUNTER — SPECIALTY PHARMACY (OUTPATIENT)
Dept: ENDOCRINOLOGY | Facility: CLINIC | Age: 53
End: 2023-05-19

## 2023-05-19 NOTE — PROGRESS NOTES
Specialty Pharmacy Patient Management Program  Endocrinology Benefits Investigation      Tereza is seen by a [facility] Endocrinology provider and is currently taking a target specialty medication.  The patient IS ELIGIBLE for enrollment in the Endocrinology Patient Management Program offered by [facility].     Insurance: [employer] ([facility])   Medication(s) and Costs:     Saxenda 28 Days, $24.98

## 2023-05-30 ENCOUNTER — OFFICE VISIT (OUTPATIENT)
Dept: ENDOCRINOLOGY | Facility: CLINIC | Age: 53
End: 2023-05-30
Payer: COMMERCIAL

## 2023-05-30 VITALS
SYSTOLIC BLOOD PRESSURE: 126 MMHG | HEART RATE: 89 BPM | OXYGEN SATURATION: 97 % | HEIGHT: 66 IN | DIASTOLIC BLOOD PRESSURE: 74 MMHG | WEIGHT: 220 LBS | BODY MASS INDEX: 35.36 KG/M2

## 2023-05-30 DIAGNOSIS — E66.09 CLASS 2 OBESITY DUE TO EXCESS CALORIES WITH BODY MASS INDEX (BMI) OF 35.0 TO 35.9 IN ADULT, UNSPECIFIED WHETHER SERIOUS COMORBIDITY PRESENT: Chronic | ICD-10-CM

## 2023-05-30 DIAGNOSIS — E11.9 TYPE 2 DIABETES MELLITUS WITHOUT COMPLICATION, WITHOUT LONG-TERM CURRENT USE OF INSULIN: Chronic | ICD-10-CM

## 2023-05-30 DIAGNOSIS — E53.8 VITAMIN B12 DEFICIENCY: ICD-10-CM

## 2023-05-30 DIAGNOSIS — E66.09 CLASS 2 OBESITY DUE TO EXCESS CALORIES WITH BODY MASS INDEX (BMI) OF 37.0 TO 37.9 IN ADULT, UNSPECIFIED WHETHER SERIOUS COMORBIDITY PRESENT: Chronic | ICD-10-CM

## 2023-05-30 DIAGNOSIS — E03.9 ACQUIRED HYPOTHYROIDISM: Primary | Chronic | ICD-10-CM

## 2023-05-30 DIAGNOSIS — E55.9 VITAMIN D DEFICIENCY: ICD-10-CM

## 2023-05-30 DIAGNOSIS — E04.2 NONTOXIC MULTINODULAR GOITER: ICD-10-CM

## 2023-05-30 LAB
EXPIRATION DATE: NORMAL
HBA1C MFR BLD: 5.8 %
Lab: NORMAL

## 2023-05-30 PROCEDURE — 82306 VITAMIN D 25 HYDROXY: CPT | Performed by: PHYSICIAN ASSISTANT

## 2023-05-30 PROCEDURE — 84443 ASSAY THYROID STIM HORMONE: CPT | Performed by: PHYSICIAN ASSISTANT

## 2023-05-30 RX ORDER — LIRAGLUTIDE 6 MG/ML
INJECTION, SOLUTION SUBCUTANEOUS
Qty: 45 ML | Refills: 1 | OUTPATIENT
Start: 2023-05-30

## 2023-05-30 RX ORDER — ACETAMINOPHEN 160 MG
2000 TABLET,DISINTEGRATING ORAL DAILY
COMMUNITY

## 2023-05-30 RX ORDER — CHOLECALCIFEROL (VITAMIN D3) 25 MCG
2500 TABLET,CHEWABLE ORAL DAILY
COMMUNITY

## 2023-05-30 NOTE — TELEPHONE ENCOUNTER
Rx Refill Note  Requested Prescriptions     Pending Prescriptions Disp Refills   • Saxenda 18 MG/3ML injection pen [Pharmacy Med Name: SAXENDA 18 MG/3 ML PEN] 45 mL 1     Sig: INJECT 3MG UNDER THE SKIN DAILY AS DIRECTED      Last office visit with prescribing clinician: 11/18/2022   Last telemedicine visit with prescribing clinician:    Next office visit with prescribing clinician: 5/30/2023                           Gladys Castillo CMA  05/30/23, 10:02 EDT

## 2023-05-30 NOTE — PROGRESS NOTES
Office Note      Date: 2023  Patient Name: Tereza Eid  MRN: [id number]  : 1970    Chief Complaint   Patient presents with    Hypothyroidism    Diabetes       History of Present Illness  Tereza Eid is a 52 y.o. female who presents today for follow up on hypothyroidism, obesity, well controlled type 2 diabetes.   Changed from Synthroid to generic levothyroxine after last visit.  She remains on 88 mcg daily.  She is taking this correctly and regularly.  She notes some palpitations.  She has not noticed any changes in her neck.  History of PACs/nonsustained atrial tachycardia.  She had recent follow-up with cardiology.  She reports increased stress for the past couple of months.  She reports that she stopped the Saxenda for about 6 weeks, just restarted 2023.  She discontinued temporarily because appetite had increased.  She is currently taking a little over 1.8 mg daily.  She reports that she has noted appetite suppression again since restarting.  She reports that she did gain weight back while off the Saxenda, but is down 6 pounds since restarting Saxenda.  She presented to ED last month with abdominal pain, mid-abdomen and toward right flank.  Workup was unremarkable.  May be swallowing air at night with CPAP.  She is going to try nose mask with strap to keep mouth closed at night.  No known complications from diabetes.  She does not take medication for diabetes.  She is taking GLP-1 RA for weight loss (Saxenda).  She reports occasional hypoglycemia.  No severe hypoglycemia.  She reports FSBG ~56 about 2 months ago, nonfasting.  She had labs recently on 2023.  CBC okay.  BMP normal.  Hepatic function panel normal except for slightly elevated alk phos 123 ().  Total cholesterol 126, LDL 53, HDL 59, triglycerides 69.  She has not been taking vitamin D regularly.  We reviewed recommended daily calcium intake of 1200 mg (through diet and/or  "supplementation).    Review of systems  Negative except as noted in HPI.    Past Medical History:   Diagnosis Date    Anxiety     Chronic constipation     Cobalamin deficiency     Depression     Dyspepsia     Dyspnea on exertion     Fatigue     H. pylori infection     treated x 2, remotely    Hashimoto's thyroiditis     Heart murmur     Heartburn     prn TUMS    Hyperlipidemia     Hypoglycemia     Hypothyroidism     Joint pain     (R) knee, (R) hip    Menopause     Morbid obesity     Non-toxic multinodular goiter     Obesity     Osteoporosis     weekly Fosamax 4508-2899    PAC (premature atrial contraction)     w/ tachycardia, [place] [place] issues    Peripheral venous insufficiency 08/07/2019    Prediabetes     A1c 5.9    Sleep apnea     CPAP compliant    Thyroid goiter     follows w/ endocrinology, on Synthroid, episodic dysphagia    Vitamin D deficiency     Well controlled type 2 diabetes mellitus       Past Surgical History:   Procedure Laterality Date    EXPLORATORY LAPAROTOMY  1989    for miscarriage, concomitant appy    APPENDECTOMY  1989    during expl.lap    LAPAROSCOPIC CHOLECYSTECTOMY  2001    for stones    GASTRIC SLEEVE LAPAROSCOPIC  12/2011    w/ Dr. Giron    COLONOSCOPY  08/06/2019    unremarkable    CARDIAC CATHETERIZATION       The following portions of the patient's history were reviewed and updated as appropriate: allergies, current medications, past family history, past medical history, past social history, past surgical history and problem list.    Vitals:  /74   Pulse 89   Ht 167.6 cm (66\")   Wt 99.8 kg (220 lb)   SpO2 97%   BMI 35.51 kg/m²     Physical Exam  Vitals reviewed.   Constitutional:       General: She is not in acute distress.  Neck:      Thyroid: Thyromegaly (firm, irregular, no distinct palpable nodules) present. No thyroid tenderness.   Lymphadenopathy:      Cervical: No cervical adenopathy.   Neurological:      Mental Status: She is alert " and oriented to person, place, and time.   Psychiatric:         Mood and Affect: Mood and affect normal.       Labs/Imaging    Hemoglobin A1c  Lab Results   Component Value Date    HGBA1C 5.8 05/30/2023    HGBA1C 5.7 11/18/2022    HGBA1C 5.4 05/17/2022     Thyroid  Lab Results   Component Value Date    TSH 3.680 11/18/2022    FREET4 1.51 05/17/2022       Current Outpatient Medications   Medication Instructions    atorvastatin (LIPITOR) 40 mg, Oral, Daily    BD Pen Needle Margareth 2nd Gen 32G X 4 MM misc USE ONE - DAILY    cyanocobalamin (VITAMIN B-12) 2,500 mcg, Oral, Daily    levothyroxine (SYNTHROID) 88 mcg, Oral, Daily    OneTouch Delica Lancets 33G misc Testing 1x per day; E11.9    OneTouch Verio test strip Testing once per day; E11.9    Saxenda 3 mg, Subcutaneous, Daily    Vitamin D3 2,000 Units, Oral, Daily       Assessment & Plan  1. Acquired hypothyroidism  Continue levothyroxine 88 mcg daily.  Check TSH today.  Will notify her of results and adjustments recommended.  - TSH; Future    2. Nontoxic multinodular goiter  Stable on exam.    3. Class 2 obesity due to excess calories with body mass index (BMI) of 35.0 to 35.9 in adult, unspecified whether serious comorbidity present  She did note weight gain after stopping Saxenda for about 6 weeks.  She is back on the Saxenda and weight is down 6 pounds.  She will continue the Saxenda.  Encouraged nutritious dietary choices, moderation of carbohydrates, avoidance of added sugar, portion control, and regular exercise.     4. Type 2 diabetes mellitus without complication, without long-term current use of insulin  A1c increased, but still looks good.  She will continue to work on diet, exercise, weight loss.  - POC Glycosylated Hemoglobin (Hb A1C)    5. Vitamin D deficiency  She has not been taking vitamin D very often.  Check 25-hydroxy vitamin D today.  - Vitamin D,25-Hydroxy; Future    6. Vitamin B12 deficiency  Currently taking vitamin B12 2500 mcg daily.  Check  fasting B12 level.  - Vitamin B12; Future      Return in about 6 months (around 11/30/2023) for next scheduled follow up. She was advised to contact the office with any interval questions or concerns.    MANI Sumner  Endocrinology  05/30/2023

## 2023-05-31 DIAGNOSIS — E03.9 ACQUIRED HYPOTHYROIDISM: ICD-10-CM

## 2023-05-31 LAB
25(OH)D3 SERPL-MCNC: 34 NG/ML (ref 30–100)
TSH SERPL DL<=0.05 MIU/L-ACNC: 0.49 UIU/ML (ref 0.27–4.2)

## 2023-05-31 RX ORDER — LEVOTHYROXINE SODIUM 88 UG/1
88 TABLET ORAL DAILY
Qty: 90 TABLET | Refills: 3 | Status: SHIPPED | OUTPATIENT
Start: 2023-05-31

## 2023-06-01 LAB — VIT B12 SERPL-MCNC: 550 PG/ML (ref 232–1245)

## 2023-07-10 ENCOUNTER — TELEPHONE (OUTPATIENT)
Dept: ENDOCRINOLOGY | Facility: CLINIC | Age: 53
End: 2023-07-10

## 2023-07-10 NOTE — TELEPHONE ENCOUNTER
Caller: Tereza Eid     Relationship: SELF     Best call back number: [phone]    What is your medical concern? PT TYPICALLY SEES [facility] AND HAS FASTING LABS AT [facility]. WE WERE UNABLE TO JENNIFER HER EARLY IN THE AM AND PT WOULD LIKE TO KNOW IF SHE NEEDS TO FAST OR IF ORDERS CAN BE PUT IN FOR A [facility] AND SHE WILL GET LABS PRIOR TO [facility]

## 2023-11-22 ENCOUNTER — OFFICE VISIT (OUTPATIENT)
Dept: ENDOCRINOLOGY | Facility: CLINIC | Age: 53
End: 2023-11-22
Payer: COMMERCIAL

## 2023-11-22 VITALS
DIASTOLIC BLOOD PRESSURE: 72 MMHG | WEIGHT: 222 LBS | SYSTOLIC BLOOD PRESSURE: 118 MMHG | BODY MASS INDEX: 34.84 KG/M2 | HEIGHT: 67 IN | HEART RATE: 78 BPM | OXYGEN SATURATION: 98 %

## 2023-11-22 DIAGNOSIS — E66.09 CLASS 2 OBESITY DUE TO EXCESS CALORIES WITH BODY MASS INDEX (BMI) OF 39.0 TO 39.9 IN ADULT, UNSPECIFIED WHETHER SERIOUS COMORBIDITY PRESENT: ICD-10-CM

## 2023-11-22 DIAGNOSIS — E11.9 TYPE 2 DIABETES MELLITUS WITHOUT COMPLICATION, WITHOUT LONG-TERM CURRENT USE OF INSULIN: Primary | ICD-10-CM

## 2023-11-22 DIAGNOSIS — E03.9 ACQUIRED HYPOTHYROIDISM: ICD-10-CM

## 2023-11-22 LAB
EXPIRATION DATE: NORMAL
EXPIRATION DATE: NORMAL
GLUCOSE BLDC GLUCOMTR-MCNC: 110 MG/DL (ref 70–130)
HBA1C MFR BLD: 5.7 % (ref 4.5–5.7)
Lab: NORMAL
Lab: NORMAL

## 2023-11-22 RX ORDER — BLOOD-GLUCOSE METER
EACH MISCELLANEOUS
Qty: 100 EACH | Refills: 3 | Status: SHIPPED | OUTPATIENT
Start: 2023-11-22

## 2023-11-22 RX ORDER — PEN NEEDLE, DIABETIC 32GX 5/32"
NEEDLE, DISPOSABLE MISCELLANEOUS
Qty: 50 EACH | Refills: 5 | Status: SHIPPED | OUTPATIENT
Start: 2023-11-22

## 2023-11-22 NOTE — PROGRESS NOTES
"     Office Note      Date: 2023  Patient Name: Tereza Eid  MRN: [id number]  : 1970    Chief Complaint   Patient presents with    Hypothyroidism       History of Present Illness:   Tereza Eid is a 53 y.o. female who presents for Hypothyroidism  . Diet controlled  diabetes and obesity   Current rx: t4- 88  and saxenda. She usually takes the  saxendda but her life has been hectic and sometimes  she misses         Subjective          Review of Systems:   Review of Systems   Constitutional:  Positive for fatigue.       The following portions of the patient's history were reviewed and updated as appropriate: allergies, current medications, past family history, past medical history, past social history, past surgical history, and problem list.    Objective     Visit Vitals  /72 (BP Location: Left arm, Patient Position: Sitting, Cuff Size: Adult)   Pulse 78   Ht 170.2 cm (67\")   Wt 101 kg (222 lb)   SpO2 98%   BMI 34.77 kg/m²           Physical Exam:  Physical Exam  Vitals reviewed.   Constitutional:       Appearance: Normal appearance.   Neck:      Comments: Firm irregular thyroid  Lymphadenopathy:      Cervical: No cervical adenopathy.   Neurological:      Mental Status: She is alert.   Psychiatric:         Mood and Affect: Mood normal.         Behavior: Behavior normal.         Thought Content: Thought content normal.         Judgment: Judgment normal.         Assessment / Plan      Assessment & Plan:  Problem List Items Addressed This Visit          Other    Class 2 obesity due to excess calories in adult (Chronic)    Current Assessment & Plan      Unchanged. The plan will be to use zepbound or wegovy in the new year          Relevant Medications    Saxenda 18 MG/3ML injection pen    Insulin Pen Needle (BD Pen Needle Margareth 2nd Gen) 32G X 4 MM misc    Acquired hypothyroidism (Chronic)    Current Assessment & Plan     Tsh normal in may. No changes needed         Relevant Medications "    levothyroxine (Synthroid) 88 MCG tablet    Type 2 diabetes mellitus without complication, without long-term current use of insulin - Primary    Current Assessment & Plan      A1c at goal. No changes needed         Relevant Orders    POC Glucose, Blood (Completed)    POC Glycosylated Hemoglobin (Hb A1C) (Completed)        Electronically signed by : Keaton Skinner MD   11/22/2023

## 2024-04-08 DIAGNOSIS — E66.09 CLASS 2 OBESITY DUE TO EXCESS CALORIES WITH BODY MASS INDEX (BMI) OF 37.0 TO 37.9 IN ADULT, UNSPECIFIED WHETHER SERIOUS COMORBIDITY PRESENT: Chronic | ICD-10-CM

## 2024-04-08 RX ORDER — LIRAGLUTIDE 6 MG/ML
3 INJECTION, SOLUTION SUBCUTANEOUS DAILY
Qty: 45 ML | Refills: 0 | Status: SHIPPED | OUTPATIENT
Start: 2024-04-08

## 2024-04-08 NOTE — TELEPHONE ENCOUNTER
Rx Refill Note  Requested Prescriptions      No prescriptions requested or ordered in this encounter        Last office visit with prescribing clinician: 11/22/2023      Next office visit with prescribing clinician: 6/5/2024       Isabella Beltran (Jodi)  04/08/24, 13:49 EDT

## 2024-04-08 NOTE — TELEPHONE ENCOUNTER
----- Message from Tereza Eid sent at 4/6/2024 12:33 PM EDT -----  Regarding: Chun   Contact: [phone]   Can you send a prescription for Saxenda to [facility] in [place]. Thank you!

## 2024-05-28 ENCOUNTER — TELEPHONE (OUTPATIENT)
Dept: ENDOCRINOLOGY | Facility: CLINIC | Age: 54
End: 2024-05-28
Payer: COMMERCIAL

## 2024-05-28 NOTE — TELEPHONE ENCOUNTER
Caller: Tereza Eid    Relationship: Self    Best call back number: [phone]     What orders are you requesting (i.e. lab or imaging): TSH/T4 (ANY THYROID LABS) AND B12    In what timeframe would the patient need to come in: WANTS TO HAVE IT DONE AND RESULTS IN BEFORE 6/5/24 APPOINTMENT    Where will you receive your lab/imaging services: [facility] IN [place] - FAX: [phone]

## 2024-05-29 DIAGNOSIS — E03.9 ACQUIRED HYPOTHYROIDISM: Primary | Chronic | ICD-10-CM

## 2024-05-30 ENCOUNTER — TELEPHONE (OUTPATIENT)
Dept: ENDOCRINOLOGY | Facility: CLINIC | Age: 54
End: 2024-05-30

## 2024-05-30 DIAGNOSIS — E55.9 VITAMIN D DEFICIENCY: ICD-10-CM

## 2024-05-30 DIAGNOSIS — E53.8 B12 DEFICIENCY: Primary | ICD-10-CM

## 2024-05-30 NOTE — TELEPHONE ENCOUNTER
Labs for tsh, free t4, b12 and vit D are in the chart. Please send to patient or to whateverlabs she wants

## 2024-05-30 NOTE — TELEPHONE ENCOUNTER
PT CALLED REQUESTING B-12 LAB BE ADDED TO HER CHART. PREVIOUS ORDER EXPIRES TODAY. SHE REQUESTED THE ORDER BE FAXED TO [facility] IN [place], [place] FAX # [phone]

## 2024-06-01 LAB
25(OH)D3+25(OH)D2 SERPL-MCNC: 24.9 NG/ML (ref 30–100)
VIT B12 SERPL-MCNC: 485 PG/ML (ref 232–1245)

## 2024-06-05 ENCOUNTER — OFFICE VISIT (OUTPATIENT)
Dept: CARDIOLOGY | Facility: CLINIC | Age: 54
End: 2024-06-05
Payer: COMMERCIAL

## 2024-06-05 ENCOUNTER — OFFICE VISIT (OUTPATIENT)
Dept: ENDOCRINOLOGY | Facility: CLINIC | Age: 54
End: 2024-06-05
Payer: COMMERCIAL

## 2024-06-05 ENCOUNTER — TELEPHONE (OUTPATIENT)
Dept: ENDOCRINOLOGY | Facility: CLINIC | Age: 54
End: 2024-06-05

## 2024-06-05 VITALS
WEIGHT: 222 LBS | DIASTOLIC BLOOD PRESSURE: 86 MMHG | SYSTOLIC BLOOD PRESSURE: 126 MMHG | BODY MASS INDEX: 34.84 KG/M2 | OXYGEN SATURATION: 97 % | HEART RATE: 70 BPM | HEIGHT: 67 IN

## 2024-06-05 VITALS
DIASTOLIC BLOOD PRESSURE: 86 MMHG | HEIGHT: 66 IN | OXYGEN SATURATION: 98 % | SYSTOLIC BLOOD PRESSURE: 132 MMHG | BODY MASS INDEX: 37.96 KG/M2 | HEART RATE: 90 BPM | WEIGHT: 236.2 LBS

## 2024-06-05 DIAGNOSIS — I49.1 PAC (PREMATURE ATRIAL CONTRACTION): Primary | ICD-10-CM

## 2024-06-05 DIAGNOSIS — R26.81 GAIT INSTABILITY: ICD-10-CM

## 2024-06-05 DIAGNOSIS — I47.19 ATRIAL TACHYCARDIA: ICD-10-CM

## 2024-06-05 DIAGNOSIS — E03.9 ACQUIRED HYPOTHYROIDISM: Chronic | ICD-10-CM

## 2024-06-05 DIAGNOSIS — E04.2 NONTOXIC MULTINODULAR GOITER: ICD-10-CM

## 2024-06-05 DIAGNOSIS — E11.9 TYPE 2 DIABETES MELLITUS WITHOUT COMPLICATION, WITHOUT LONG-TERM CURRENT USE OF INSULIN: Primary | ICD-10-CM

## 2024-06-05 DIAGNOSIS — G47.33 OSA (OBSTRUCTIVE SLEEP APNEA): ICD-10-CM

## 2024-06-05 DIAGNOSIS — E66.09 CLASS 2 OBESITY DUE TO EXCESS CALORIES WITH BODY MASS INDEX (BMI) OF 35.0 TO 35.9 IN ADULT, UNSPECIFIED WHETHER SERIOUS COMORBIDITY PRESENT: Chronic | ICD-10-CM

## 2024-06-05 LAB
EXPIRATION DATE: ABNORMAL
EXPIRATION DATE: NORMAL
GLUCOSE BLDC GLUCOMTR-MCNC: 85 MG/DL (ref 70–130)
HBA1C MFR BLD: 5.9 % (ref 4.5–5.7)
Lab: ABNORMAL
Lab: NORMAL

## 2024-06-05 PROCEDURE — 99214 OFFICE O/P EST MOD 30 MIN: CPT | Performed by: INTERNAL MEDICINE

## 2024-06-05 RX ORDER — ASPIRIN 81 MG/1
81 TABLET ORAL DAILY
COMMUNITY

## 2024-06-05 RX ORDER — LEVOTHYROXINE SODIUM 88 UG/1
88 TABLET ORAL DAILY
Qty: 90 TABLET | Refills: 3 | Status: SHIPPED | OUTPATIENT
Start: 2024-06-05

## 2024-06-05 RX ORDER — ERGOCALCIFEROL (VITAMIN D2) 10 MCG
400 TABLET ORAL DAILY
COMMUNITY

## 2024-06-05 NOTE — PROGRESS NOTES
Tereza Eid  1970  [phone]    06/05/2024    [facility] CARD[facility]     Referring Provider: No ref. provider found     Fawad Bourne MD  [address] [place] [address]    Chief Complaint   Patient presents with    PAC (premature atrial contraction)       Problem List:     PAC's/NSAT  Longstanding history of PAC's/palpitations  CHADSVASc = 2, initiated on Xarelto  Echocardiogram 2/6/2019: EF 70%, mild MR/TR  Event monitor 5/21/2019: Predominantly normal sinus rhythm, PACs, questionable AF more consistent with PAC's/NSAT  ER visit 07/2019 with tachy-palpitations, work up   5/17/2023 5-day monitor with normal sinus rhythm rare PACs.  3/2024 8 day monitor NSR rare pac's, nsat,   SOB  Heart cath Dr Green 1/29/2019 LVEF NL, NL cors  Hyperlipidemia  Hypothyroidism  PRACHI, on CPAP  GERD  Anxiety  Surgical history  Appendectomy  Cholecystectomy  Gastrectomy sleeve    Allergies  Allergies   Allergen Reactions    Aleve [Naproxen] Palpitations    Codeine Palpitations and Other (See Comments)    Escitalopram Palpitations    Lexapro [Escitalopram Oxalate] Palpitations       Current Medications    Current Outpatient Medications:     aspirin 81 MG EC tablet, Take 1 tablet by mouth Daily., Disp: , Rfl:     atorvastatin (LIPITOR) 40 MG tablet, Take 1 tablet by mouth Daily., Disp: , Rfl:     cyanocobalamin (VITAMIN B-12) 2500 MCG tablet tablet, Take 1 tablet by mouth Daily., Disp: , Rfl:     levothyroxine (Synthroid) 88 MCG tablet, Take 1 tablet by mouth Daily., Disp: 90 tablet, Rfl: 3    OneTouch Delica Lancets 33G misc, Testing 1x per day; E11.9, Disp: 50 each, Rfl: 11    OneTouch Verio test strip, Testing once per day; E11.9, Disp: 100 each, Rfl: 3    Tirzepatide-Weight Management (ZEPBOUND) 2.5 MG/0.5ML solution auto-injector, Inject 0.5 mL under the skin into the appropriate area as directed 1 (One) Time Per Week., Disp: 2 mL, Rfl: 0    Vitamin D, Cholecalciferol, (CHOLECALCIFEROL) 10  "MCG (400 UNIT) tablet, Take 1 tablet by mouth Daily., Disp: , Rfl:     History of Present Illness     Pt presents for follow up of PAC/NSAT/PRACHI . Since we last saw the pt, pt was seen in the ER visit at [facility] on 2/29/2024 due to gait imbalance. Head CT scan and MRI NL per pt. Saw Dr Green with NL heart monitor.  Carotids ultrasound, CT scan of the neck and head, and echocardiogram also unremarkable per the patient.  No further episodes.  Denies any left arm left leg weakness or slurred speech.  Does continue to have short-lived episodes (seconds) of tachypalpitations that are minimally symptomatic.  No near-syncope or syncope.  No chest pain.  No dyspnea on exertion.  Blood pressure has been well-controlled at home.  She has been compliant with her medical therapy.  She was post to see a neurologist after her ER presentation but this subsequently was canceled by Dr. Green.      Vitals:    06/05/24 1132   BP: 132/86   BP Location: Left arm   Patient Position: Sitting   Cuff Size: Adult   Pulse: 90   SpO2: 98%   Weight: 107 kg (236 lb 3.2 oz)   Height: 167.6 cm (66\")     Body mass index is 38.12 kg/m².  PE:  General: NAD  Neck: no JVD, no carotid bruits, no TM  Heart RRR, NL S1, S2, S4 present, no rubs, murmurs  Lungs: CTA, no wheezes, rhonchi, or rales  Abd: soft, non-tender, NL BS  Ext: No musculoskeletal deformities, no edema, cyanosis, or clubbing  Psych: normal mood and affect    Diagnostic Data:      Procedures           1. PAC (premature atrial contraction)    2. Atrial tachycardia    3. PRACHI (obstructive sleep apnea)    4. Gait instability          Plan:    1. PAC/NSAT:   - doing well off medications.  Minimally symptomatic.  No evidence of atrial fibrillation.  Continue to monitor for now.  Long discussion with the patient.  Did explain to her signs and symptoms of atrial fibrillation and when to notify us by telephone.     2. PRACHI: on CPAP in past    3.  Transient gait imbalance etiology unknown follow-up with " primary doctor.    F/up in 12 months

## 2024-06-05 NOTE — TELEPHONE ENCOUNTER
Patient called office, stated that she saw Dr. Skinner this morning and meant to ask him if we had any samples of zepbound. She stated she is still in [place] and would be for another hour. She would like a call back.

## 2024-06-05 NOTE — PROGRESS NOTES
"     Office Note      Date: 2024  Patient Name: Tereza Eid  MRN: [id number]  : 1970    Chief Complaint   Patient presents with    Diabetes       History of Present Illness:   Tereza Eid is a 53 y.o. female who presents for Diabetes type 2.   Current RX diet and saxenda   Hypothyroidism on T4 - 88    Bg checks are done:daily   Hypoglycemia :none    Labs reviewed: tsh, A1c, b12     Last A1c:  Hemoglobin A1C   Date Value Ref Range Status   2024 5.9 (A) 4.5 - 5.7 % Final       Changes in health since last visit: had to go to the er with [facility] . Last eye exam up to date  Feet are good. .    Subjective              Review of Systems:   Review of Systems   Endocrine: Negative for polydipsia and polyuria.       The following portions of the patient's history were reviewed and updated as appropriate: allergies, current medications, past family history, past medical history, past social history, past surgical history, and problem list.    Objective     Visit Vitals  /86 (BP Location: Left arm, Patient Position: Sitting, Cuff Size: Adult)   Pulse 70   Ht 170.2 cm (67\")   Wt 101 kg (222 lb)   SpO2 97%   BMI 34.77 kg/m²           Physical Exam:  Physical Exam  Vitals reviewed.   Constitutional:       Appearance: Normal appearance.   Neck:      Comments: Lumpy bumpy thyroid- unchanged   Lymphadenopathy:      Cervical: No cervical adenopathy.   Neurological:      Mental Status: She is alert.   Psychiatric:         Mood and Affect: Mood normal.         Behavior: Behavior normal.         Thought Content: Thought content normal.         Judgment: Judgment normal.          Assessment / Plan      Assessment & Plan:  Problem List Items Addressed This Visit       Class 2 obesity due to excess calories in adult (Chronic)    Current Assessment & Plan     Unchanged    Plan: switch saxenda  to zepbound         Relevant Medications    Tirzepatide-Weight Management (ZEPBOUND) 2.5 MG/0.5ML " solution auto-injector    Nontoxic multinodular goiter    Overview     Last thyroid ultrasound 11/12/2018: Enlarged gland with diffuse heterogeneity, numerous small nodules bilaterally, no dominant nodules. No intervention needed unless goiter becomes bothersome to her.         Current Assessment & Plan     Stable on exam.   No changes to the plan are needed         Relevant Medications    levothyroxine (Synthroid) 88 MCG tablet    Type 2 diabetes mellitus without complication, without long-term current use of insulin - Primary    Current Assessment & Plan      Stable in good control  No changes are needed         Relevant Orders    POC Glucose, Blood (Completed)    POC Glycosylated Hemoglobin (Hb A1C) (Completed)    Acquired hypothyroidism (Chronic)    Current Assessment & Plan     Euthryoid on current dose.   Plan : no changes         Relevant Medications    levothyroxine (Synthroid) 88 MCG tablet         Electronically signed by : Keaton Skinner MD  06/05/2024

## 2024-06-05 NOTE — TELEPHONE ENCOUNTER
Patient notified that we do not currently have any samples of Zepbound.  Patient verbalized understanding.

## 2024-06-20 ENCOUNTER — TELEPHONE (OUTPATIENT)
Age: 54
End: 2024-06-20
Payer: COMMERCIAL

## 2024-06-20 NOTE — TELEPHONE ENCOUNTER
PATIENT STATES THAT HER WEIGHT AT LAST VISIT WAS ENTERED INCORRECTLY. PATIENT STATES THAT SHE WEIGHED 232 AT VISIT AND IT WAS ENTERED  AND WOULD LIKE THIS INFORMATION CORRECTED AS SHE IS NOW USING ZEPBOUND.

## 2024-07-01 DIAGNOSIS — E66.09 CLASS 2 OBESITY DUE TO EXCESS CALORIES WITH BODY MASS INDEX (BMI) OF 35.0 TO 35.9 IN ADULT, UNSPECIFIED WHETHER SERIOUS COMORBIDITY PRESENT: Chronic | ICD-10-CM

## 2024-07-01 NOTE — TELEPHONE ENCOUNTER
Patient called office, stated that she took her last dose of zepbound on Thursday. Stated she tolerated 2.5 well and would like to be increased to next dose. She would like this called into [facility] in [place]

## 2024-07-29 RX ORDER — TIRZEPATIDE 7.5 MG/.5ML
7.5 INJECTION, SOLUTION SUBCUTANEOUS WEEKLY
Qty: 2 ML | Refills: 0 | Status: SHIPPED | OUTPATIENT
Start: 2024-07-29

## 2024-07-29 RX ORDER — TIRZEPATIDE 5 MG/.5ML
INJECTION, SOLUTION SUBCUTANEOUS
Qty: 2 ML | Refills: 0 | OUTPATIENT
Start: 2024-07-29

## 2024-07-29 NOTE — TELEPHONE ENCOUNTER
PATIENT IS TAKING ZEPBOUND AND NEEDS TO TALK TO SOMEONE IN CLINICAL TO FOLLOW UP SO PATIENT CAN GET A PRESCRIPTION REFILL.      PATIENT NUMBER [phone]

## 2024-07-29 NOTE — TELEPHONE ENCOUNTER
Spoke with patient.  She states that she is tolerating the 5mg dose and is okay with dose increase.  Rx pended.

## 2024-08-26 NOTE — TELEPHONE ENCOUNTER
Rx Refill Note  Requested Prescriptions     Pending Prescriptions Disp Refills    Zepbound 7.5 MG/0.5ML solution auto-injector [Pharmacy Med Name: ZEPBOUND 7.5 MG/0.5 ML PEN] 2 mL 0     Sig: INJECT 7.5 MG UNDER THE SKIN ONCE WEEKLY      Last office visit with prescribing clinician: 6/5/2024     Next office visit with prescribing clinician: 10/14/2024                           Kacey Nath MA  08/26/24, 10:42 EDT

## 2024-08-27 RX ORDER — TIRZEPATIDE 7.5 MG/.5ML
INJECTION, SOLUTION SUBCUTANEOUS
Qty: 2 ML | Refills: 0 | Status: SHIPPED | OUTPATIENT
Start: 2024-08-27

## 2024-09-23 RX ORDER — TIRZEPATIDE 7.5 MG/.5ML
7.5 INJECTION, SOLUTION SUBCUTANEOUS WEEKLY
Qty: 2 ML | Refills: 0 | Status: SHIPPED | OUTPATIENT
Start: 2024-09-23 | End: 2024-09-25

## 2024-09-25 RX ORDER — TIRZEPATIDE 7.5 MG/.5ML
INJECTION, SOLUTION SUBCUTANEOUS
Qty: 2 ML | Refills: 0 | Status: SHIPPED | OUTPATIENT
Start: 2024-09-25

## 2024-10-14 ENCOUNTER — OFFICE VISIT (OUTPATIENT)
Age: 54
End: 2024-10-14
Payer: COMMERCIAL

## 2024-10-14 VITALS
OXYGEN SATURATION: 98 % | SYSTOLIC BLOOD PRESSURE: 126 MMHG | HEART RATE: 81 BPM | BODY MASS INDEX: 32.78 KG/M2 | WEIGHT: 204 LBS | DIASTOLIC BLOOD PRESSURE: 74 MMHG | HEIGHT: 66 IN

## 2024-10-14 DIAGNOSIS — E11.9 TYPE 2 DIABETES MELLITUS WITHOUT COMPLICATION, WITHOUT LONG-TERM CURRENT USE OF INSULIN: Primary | ICD-10-CM

## 2024-10-14 LAB
EXPIRATION DATE: ABNORMAL
EXPIRATION DATE: NORMAL
GLUCOSE BLDC GLUCOMTR-MCNC: 67 MG/DL (ref 70–130)
HBA1C MFR BLD: 5.2 % (ref 4.5–5.7)
Lab: ABNORMAL
Lab: NORMAL

## 2024-10-14 PROCEDURE — 99213 OFFICE O/P EST LOW 20 MIN: CPT | Performed by: INTERNAL MEDICINE

## 2024-10-14 PROCEDURE — 84443 ASSAY THYROID STIM HORMONE: CPT | Performed by: INTERNAL MEDICINE

## 2024-10-14 PROCEDURE — 83036 HEMOGLOBIN GLYCOSYLATED A1C: CPT | Performed by: INTERNAL MEDICINE

## 2024-10-14 PROCEDURE — 84439 ASSAY OF FREE THYROXINE: CPT | Performed by: INTERNAL MEDICINE

## 2024-10-14 PROCEDURE — 82947 ASSAY GLUCOSE BLOOD QUANT: CPT | Performed by: INTERNAL MEDICINE

## 2024-10-14 NOTE — PROGRESS NOTES
"     Office Note      Date: 10/14/2024  Patient Name: Tereza Eid  MRN: [id number]  : 1970    Chief Complaint   Patient presents with    Diabetes       History of Present Illness:   Tereza Eid is a 53 y.o. female who presents for Diabetes type 2.   Current RX diet and zepbound     Bg checks are done:occasional   Hypoglycemia : mild       Last A1c:  Hemoglobin A1C   Date Value Ref Range Status   10/14/2024 5.2 4.5 - 5.7 % Final       Changes in health since last visit: none . Last eye exam up to date.    Subjective              Review of Systems:   Review of Systems   Endocrine: Negative for polydipsia and polyuria.       The following portions of the patient's history were reviewed and updated as appropriate: allergies, current medications, past family history, past medical history, past social history, past surgical history, and problem list.    Objective     Visit Vitals  /74 (BP Location: Right arm, Patient Position: Standing, Cuff Size: Adult)   Pulse 81   Ht 167.6 cm (66\")   Wt 92.5 kg (204 lb)   SpO2 98%   BMI 32.93 kg/m²           Physical Exam:  Physical Exam  Vitals reviewed.   Constitutional:       Appearance: Normal appearance.   Neurological:      Mental Status: She is alert.   Psychiatric:         Mood and Affect: Mood normal.         Behavior: Behavior normal.         Thought Content: Thought content normal.         Judgment: Judgment normal.          Assessment / Plan      Assessment & Plan:  Problem List Items Addressed This Visit       Type 2 diabetes mellitus without complication, without long-term current use of insulin - Primary    Current Assessment & Plan      Improved  Plan : no changes         Relevant Orders    POC Glucose, Blood (Completed)    POC Glycosylated Hemoglobin (Hb A1C) (Completed)         Electronically signed by : Keaton Skinner MD  10/14/2024  "

## 2024-10-22 NOTE — TELEPHONE ENCOUNTER
Rx Refill Note  Requested Prescriptions     Pending Prescriptions Disp Refills    Tirzepatide-Weight Management (Zepbound) 7.5 MG/0.5ML solution auto-injector [Pharmacy Med Name: ZEPBOUND 7.5 MG/0.5 ML PEN] 2 mL 1     Sig: INJECT 7.5 MG UNDER THE SKIN ONCE WEEKLY      Last office visit with prescribing clinician: 10/14/2024     Next office visit with prescribing clinician: 4/1/2025       Robbin Johnson MA  10/22/24, 13:26 EDT

## 2024-10-23 RX ORDER — TIRZEPATIDE 7.5 MG/.5ML
INJECTION, SOLUTION SUBCUTANEOUS
Qty: 2 ML | Refills: 1 | Status: SHIPPED | OUTPATIENT
Start: 2024-10-23

## 2024-12-06 RX ORDER — TIRZEPATIDE 7.5 MG/.5ML
INJECTION, SOLUTION SUBCUTANEOUS
Qty: 2 ML | Refills: 1 | Status: SHIPPED | OUTPATIENT
Start: 2024-12-06

## 2024-12-30 RX ORDER — BLOOD SUGAR DIAGNOSTIC
STRIP MISCELLANEOUS
Qty: 50 EACH | Refills: 1 | Status: SHIPPED | OUTPATIENT
Start: 2024-12-30

## 2025-02-06 RX ORDER — TIRZEPATIDE 7.5 MG/.5ML
INJECTION, SOLUTION SUBCUTANEOUS
Qty: 2 ML | Refills: 1 | Status: SHIPPED | OUTPATIENT
Start: 2025-02-06

## 2025-02-06 NOTE — TELEPHONE ENCOUNTER
Rx Refill Note  Requested Prescriptions     Pending Prescriptions Disp Refills    Zepbound 7.5 MG/0.5ML solution auto-injector [Pharmacy Med Name: ZEPBOUND 7.5 MG/0.5 ML PEN] 2 mL 1     Sig: INJECT 7.5 MG UNDER THE SKIN ONCE WEEKLY      Last office visit with prescribing clinician: 10/14/2024      Next office visit with prescribing clinician: 4/1/2025       Shannan Maradiaga MA  02/06/25, 10:36 EST

## 2025-03-17 ENCOUNTER — TELEPHONE (OUTPATIENT)
Age: 55
End: 2025-03-17
Payer: COMMERCIAL

## 2025-03-17 ENCOUNTER — TELEPHONE (OUTPATIENT)
Age: 55
End: 2025-03-17

## 2025-03-17 NOTE — TELEPHONE ENCOUNTER
Caller: Tereza Eid    Relationship to patient: Self      Best call back number: [phone]; [facility]    Provider: WESLEY SCHWARZ MD    Medication PA needed: Zepbound 7.5 MG/0.5ML solution auto-injector     Reason for call/Prior Auth: PRIOR AUTH , WILL NEED FOR NEXT MONTH. PRICE WENT UP FROM $24.99 TO $60 THIS MONTH.

## 2025-03-28 NOTE — PROGRESS NOTES
"    Cardiology Established Patient Note     Name: Tereza Eid  :   1970  PCP: Fawad Bourne MD  Date:   2025  Department: [facility] CARDIOLOGY  [address]  [facility] [address]  Fax [phone]  Phone [phone]    Chief Complaint: Here for my cholesterol   Subjective     History of Present Illness  Tereza Eid is a 54 y.o. female who presents today for 6 month follow up.     Problem list:  Dyspnea  Cath 2020: Hyperdynamic LV function, normal coronaries, EF 80%  CT coronary calcium score 14  Hyperlipidemia  3/26/2025: LDL 60  Diabetes type 2  10/14/2024: A1c 5.2%  Chronic venous insufficiency  Venous duplex 10/1/2021: Deep system reflux present in the left lower extremity contralateral right CFV  Hypertension  Echo 3/29/2024: EF 70%, mild LVH, trace AI, mild MR, mild TR  Fatty liver    Current Outpatient Medications   Medication Instructions    aspirin 81 mg, Daily    atorvastatin (LIPITOR) 40 mg, Daily    cyanocobalamin (VITAMIN B-12) 2,500 mcg, Daily    levothyroxine (SYNTHROID) 88 mcg, Oral, Daily    OneTouch Delica Lancets 33G misc Testing 1x per day; E11.9    OneTouch Verio test strip USE STRIP TO TEST ONCE A DAY    Zepbound 7.5 MG/0.5ML solution auto-injector INJECT 7.5 MG UNDER THE SKIN ONCE WEEKLY        LDL 52 3/2025       Lab Results   Component Value Date    TSH 1.220 10/14/2024     Lab Results   Component Value Date    HGBA1C 5.2 10/14/2024        Microalbumin, Urine   Date Value Ref Range Status   2020 <1.2 mg/dL Final         Objective     Vital Signs:  There were no vitals taken for this visit.  Estimated body mass index is 32.93 kg/m² as calculated from the following:    Height as of 10/14/24: 167.6 cm (66\").    Weight as of 10/14/24: 92.5 kg (204 lb).       Cardiovascular:      PMI at left midclavicular line. Normal rate. Regular rhythm. Normal S1. Normal S2.       Murmurs: There is no " murmur.      No gallop.  No click. No rub.   Pulses:     Intact distal pulses.   Edema:     Peripheral edema absent.               Assessment and Plan     Assessment & Plan  CAD in native artery  Coronary Artery Disease (OPTIONAL): Coronary artery disease is stable.  Continue current treatment regimen.  Cardiac status will be reassessed in 6 months.    Orders:    Lipoprotein A (LPA); Future    Hyperlipidemia LDL goal <55   Lipid abnormalities are stable    Plan:  Continue same medication/s without change.      Discussed medication dosage, use, side effects, and goals of treatment in detail.    Counseled patient on lifestyle modifications to help control hyperlipidemia.     Patient Treatment Goals:   LDL goal is less than 55    Followup in 6 months.    Orders:    Hepatic Function Panel; Future    High Sensitivity CRP; Future    Lipoprotein A (LPA); Future    Type 2 diabetes mellitus with hyperglycemia, without long-term current use of insulin  Diabetes is stable.   Continue current treatment regimen.  Diabetes will be reassessed in 6 months    Orders:    Hemoglobin A1c; Future    Primary hypertension  Stable  Re asses renal function.    Orders:    Comprehensive Metabolic Panel; Future    Microalbumin / Creatinine Urine Ratio - Urine, Clean Catch; Future      Follow Up  No follow-ups on file.    [facility] Cardiology

## 2025-03-31 ENCOUNTER — PRIOR AUTHORIZATION (OUTPATIENT)
Dept: ENDOCRINOLOGY | Facility: CLINIC | Age: 55
End: 2025-03-31
Payer: COMMERCIAL

## 2025-04-01 ENCOUNTER — LAB (OUTPATIENT)
Facility: HOSPITAL | Age: 55
End: 2025-04-01
Payer: COMMERCIAL

## 2025-04-01 ENCOUNTER — OFFICE VISIT (OUTPATIENT)
Age: 55
End: 2025-04-01
Payer: COMMERCIAL

## 2025-04-01 VITALS
HEIGHT: 66 IN | WEIGHT: 172 LBS | SYSTOLIC BLOOD PRESSURE: 118 MMHG | BODY MASS INDEX: 27.64 KG/M2 | DIASTOLIC BLOOD PRESSURE: 78 MMHG

## 2025-04-01 VITALS
HEIGHT: 66 IN | HEART RATE: 71 BPM | DIASTOLIC BLOOD PRESSURE: 74 MMHG | WEIGHT: 172.6 LBS | BODY MASS INDEX: 27.74 KG/M2 | SYSTOLIC BLOOD PRESSURE: 132 MMHG

## 2025-04-01 DIAGNOSIS — E11.65 TYPE 2 DIABETES MELLITUS WITH HYPERGLYCEMIA, WITHOUT LONG-TERM CURRENT USE OF INSULIN: ICD-10-CM

## 2025-04-01 DIAGNOSIS — E03.9 ACQUIRED HYPOTHYROIDISM: Chronic | ICD-10-CM

## 2025-04-01 DIAGNOSIS — E11.9 TYPE 2 DIABETES MELLITUS WITHOUT COMPLICATION, WITHOUT LONG-TERM CURRENT USE OF INSULIN: Primary | ICD-10-CM

## 2025-04-01 DIAGNOSIS — I25.10 CAD IN NATIVE ARTERY: ICD-10-CM

## 2025-04-01 DIAGNOSIS — I10 PRIMARY HYPERTENSION: ICD-10-CM

## 2025-04-01 DIAGNOSIS — E66.812 CLASS 2 OBESITY DUE TO EXCESS CALORIES WITH BODY MASS INDEX (BMI) OF 35.0 TO 35.9 IN ADULT, UNSPECIFIED WHETHER SERIOUS COMORBIDITY PRESENT: Chronic | ICD-10-CM

## 2025-04-01 DIAGNOSIS — I25.10 CAD IN NATIVE ARTERY: Primary | ICD-10-CM

## 2025-04-01 DIAGNOSIS — E66.09 CLASS 2 OBESITY DUE TO EXCESS CALORIES WITH BODY MASS INDEX (BMI) OF 35.0 TO 35.9 IN ADULT, UNSPECIFIED WHETHER SERIOUS COMORBIDITY PRESENT: Chronic | ICD-10-CM

## 2025-04-01 DIAGNOSIS — E78.5 HYPERLIPIDEMIA LDL GOAL <55: ICD-10-CM

## 2025-04-01 LAB
BILIRUB CONJ SERPL-MCNC: 0.3 MG/DL (ref 0–0.3)
HBA1C MFR BLD: 4.7 % (ref 4.8–5.6)

## 2025-04-01 PROCEDURE — 80053 COMPREHEN METABOLIC PANEL: CPT

## 2025-04-01 PROCEDURE — 83036 HEMOGLOBIN GLYCOSYLATED A1C: CPT

## 2025-04-01 PROCEDURE — 82248 BILIRUBIN DIRECT: CPT

## 2025-04-01 PROCEDURE — 82652 VIT D 1 25-DIHYDROXY: CPT

## 2025-04-01 PROCEDURE — 82306 VITAMIN D 25 HYDROXY: CPT | Performed by: INTERNAL MEDICINE

## 2025-04-01 PROCEDURE — 99214 OFFICE O/P EST MOD 30 MIN: CPT | Performed by: INTERNAL MEDICINE

## 2025-04-01 PROCEDURE — 82607 VITAMIN B-12: CPT | Performed by: INTERNAL MEDICINE

## 2025-04-01 PROCEDURE — 86141 C-REACTIVE PROTEIN HS: CPT

## 2025-04-01 PROCEDURE — 36415 COLL VENOUS BLD VENIPUNCTURE: CPT

## 2025-04-01 PROCEDURE — 82043 UR ALBUMIN QUANTITATIVE: CPT

## 2025-04-01 PROCEDURE — 83695 ASSAY OF LIPOPROTEIN(A): CPT

## 2025-04-01 PROCEDURE — 82570 ASSAY OF URINE CREATININE: CPT

## 2025-04-01 RX ORDER — LEVOTHYROXINE SODIUM 88 UG/1
88 TABLET ORAL DAILY
Qty: 90 TABLET | Refills: 3 | Status: SHIPPED | OUTPATIENT
Start: 2025-04-01

## 2025-04-01 RX ORDER — TIRZEPATIDE 7.5 MG/.5ML
7.5 INJECTION, SOLUTION SUBCUTANEOUS WEEKLY
Qty: 2 ML | Refills: 5 | Status: SHIPPED | OUTPATIENT
Start: 2025-04-01

## 2025-04-01 RX ORDER — NYSTATIN 100000 [USP'U]/G
POWDER TOPICAL
COMMUNITY
Start: 2025-01-09

## 2025-04-01 NOTE — PROGRESS NOTES
"     Office Note      Date: 2025  Patient Name: Tereza Eid  MRN: [id number]  : 1970    Chief Complaint   Patient presents with    Diabetes       History of Present Illness:   Tereza Eid is a 54 y.o. female who presents for Diabetes type 2.   Current RX diet and zepbound for obesity     Bg checks are done:daily   Hypoglycemia : none. They are all normal    She had A1c drawn yesterday at her cardiologist. It is not done yet       Last A1c:  Hemoglobin A1C   Date Value Ref Range Status   10/14/2024 5.2 4.5 - 5.7 % Final       Changes in health since last visit: none.     Subjective            Review of Systems:   Review of Systems   Gastrointestinal:  Positive for constipation.       The following portions of the patient's history were reviewed and updated as appropriate: allergies, current medications, past family history, past medical history, past social history, past surgical history, and problem list.    Objective     Visit Vitals  /78 (BP Location: Right arm, Patient Position: Sitting, Cuff Size: Adult)   Ht 167.6 cm (66\")   Wt 78 kg (172 lb)   BMI 27.76 kg/m²           Physical Exam:  Physical Exam  Vitals reviewed.   Constitutional:       Appearance: Normal appearance. She is normal weight.   Musculoskeletal:      Right foot: Normal range of motion. No deformity, bunion, Charcot foot, foot drop or prominent metatarsal heads.      Left foot: Normal range of motion. No deformity, bunion, Charcot foot, foot drop or prominent metatarsal heads.   Feet:      Left foot:      Protective Sensation: 10 sites tested.        Comments:  Feet are good   Neurological:      Mental Status: She is alert.   Psychiatric:         Mood and Affect: Mood normal.         Behavior: Behavior normal.         Thought Content: Thought content normal.         Judgment: Judgment normal.          Assessment / Plan      Assessment & Plan:  Problem List Items Addressed This Visit       Class 2 obesity due " to excess calories in adult (Chronic)    Current Assessment & Plan    Improved. Now overweight . Not obese. Stay on zepbound          Type 2 diabetes mellitus without complication, without long-term current use of insulin - Primary    Current Assessment & Plan   Based upon home bg readings this is stable  Plan : review A1c when reading tomorrow.          Acquired hypothyroidism (Chronic)    Current Assessment & Plan   Euthyroid  based upon October labs.          Relevant Medications    levothyroxine (Synthroid) 88 MCG tablet         Electronically signed by : Keaton Skinner MD  04/01/2025

## 2025-04-01 NOTE — TELEPHONE ENCOUNTER
ASHER HURST (Key: [id number])  Zepbound 7.5MG/0.5ML pen-injectors  Form  [facility] Electronic PA Form (2017 NCPDP)  Created  24 hours ago  Sent to Plan  24 hours ago  Plan Response  23 hours ago  Submit Clinical Questions  23 hours ago  Determination  Favorable  23 hours ago  Message from Plan  Your PA request has been approved. Additional information will be provided in the approval communication. (Message 1142). Authorization Expiration Date: March 31, 2026.

## 2025-04-02 LAB
ALBUMIN SERPL-MCNC: 4.1 G/DL (ref 3.5–5.2)
ALBUMIN UR-MCNC: <1.2 MG/DL
ALBUMIN/GLOB SERPL: 1.1 G/DL
ALP SERPL-CCNC: 115 U/L (ref 39–117)
ALT SERPL W P-5'-P-CCNC: 11 U/L (ref 1–33)
ANION GAP SERPL CALCULATED.3IONS-SCNC: 11.5 MMOL/L (ref 5–15)
AST SERPL-CCNC: 23 U/L (ref 1–32)
BILIRUB SERPL-MCNC: 0.9 MG/DL (ref 0–1.2)
BUN SERPL-MCNC: 9 MG/DL (ref 6–20)
BUN/CREAT SERPL: 11.5 (ref 7–25)
CALCIUM SPEC-SCNC: 9.7 MG/DL (ref 8.6–10.5)
CHLORIDE SERPL-SCNC: 102 MMOL/L (ref 98–107)
CO2 SERPL-SCNC: 24.5 MMOL/L (ref 22–29)
CREAT SERPL-MCNC: 0.78 MG/DL (ref 0.57–1)
CREAT UR-MCNC: 166.6 MG/DL
CRP SERPL-MCNC: 0.06 MG/DL (ref 0.01–0.5)
EGFRCR SERPLBLD CKD-EPI 2021: 89.3 ML/MIN/1.73
GLOBULIN UR ELPH-MCNC: 3.8 GM/DL
GLUCOSE SERPL-MCNC: 92 MG/DL (ref 65–99)
MICROALBUMIN/CREAT UR: NORMAL MG/G{CREAT}
POTASSIUM SERPL-SCNC: 3.8 MMOL/L (ref 3.5–5.2)
PROT SERPL-MCNC: 7.9 G/DL (ref 6–8.5)
SODIUM SERPL-SCNC: 138 MMOL/L (ref 136–145)

## 2025-04-03 LAB — LPA SERPL-SCNC: 33.8 NMOL/L

## 2025-04-04 LAB — 1,25(OH)2D SERPL-MCNC: 65.3 PG/ML (ref 24.8–81.5)

## 2025-04-09 LAB — SPECIMEN STATUS: NORMAL

## 2025-04-09 RX ORDER — TIRZEPATIDE 7.5 MG/.5ML
INJECTION, SOLUTION SUBCUTANEOUS
Qty: 2 ML | Refills: 5 | OUTPATIENT
Start: 2025-04-09

## 2025-04-25 ENCOUNTER — LAB (OUTPATIENT)
Facility: HOSPITAL | Age: 55
End: 2025-04-25
Payer: COMMERCIAL

## 2025-04-25 DIAGNOSIS — E55.9 VITAMIN D DEFICIENCY: ICD-10-CM

## 2025-04-25 DIAGNOSIS — E53.8 VITAMIN B12 DEFICIENCY (NON ANEMIC): ICD-10-CM

## 2025-04-25 LAB
25(OH)D3 SERPL-MCNC: 32.6 NG/ML (ref 30–100)
VIT B12 BLD-MCNC: 176 PG/ML (ref 211–946)

## 2025-04-25 PROCEDURE — 82607 VITAMIN B-12: CPT

## 2025-04-25 PROCEDURE — 36415 COLL VENOUS BLD VENIPUNCTURE: CPT

## 2025-04-25 PROCEDURE — 82306 VITAMIN D 25 HYDROXY: CPT

## 2025-04-28 ENCOUNTER — RESULTS FOLLOW-UP (OUTPATIENT)
Facility: HOSPITAL | Age: 55
End: 2025-04-28
Payer: COMMERCIAL

## 2025-04-28 RX ORDER — BLOOD SUGAR DIAGNOSTIC
STRIP MISCELLANEOUS
Qty: 90 EACH | Refills: 1 | Status: SHIPPED | OUTPATIENT
Start: 2025-04-28

## 2025-05-01 NOTE — PROGRESS NOTES
Cardiology Established Patient Note     Name: Tereza Eid  :   1970  PCP: Kathryn Lomax APRN  Date:   2025  Department: [facility] [place] [facility] CARDIOLOGY  [address]  [facility] [address]  Fax [phone]  Phone [phone]    Chief Complaint:  Here for my heart      Problem list:  Dyspnea  Cath 2020: Hyperdynamic LV function, normal coronaries, EF 80%  CT coronary calcium score 14  Hyperlipidemia  3/26/2025: LDL 60  Diabetes type 2  10/14/2024: A1c 5.2%  Chronic venous insufficiency  Venous duplex 10/1/2021: Deep system reflux present in the left lower extremity contralateral right CFV  Hypertension  Echo 3/29/2024: EF 70%, mild LVH, trace AI, mild MR, mild TR  Fatty liver    Subjective     History of Present Illness  Tereza Eid is a 54 y.o. female who presents today for follow up.  She had lost over 60 pounds on Zepbound.  She feels great.  She can walk several blocks without difficulty.  She feels well having no chest pain or SOB.  Patient  A1C is within guidelines  Nl.LPa  The following data was reviewed by: Beverly Green MD on 2025:    Lab Results   Component Value Date    GLUCOSE 92 2025    BUN 9 2025    CREATININE 0.78 2025    EGFRIFNONA 91 2022    BCR 11.5 2025    K 3.8 2025    CO2 24.5 2025    CALCIUM 9.7 2025    ALBUMIN 4.1 2025    AST 23 2025    ALT 11 2025        Lab Results   Component Value Date    WBC 6.46 2023    RBC 4.34 2023    HGB 12.1 2023    HCT 38.6 2023    MCV 88.9 2023     2023     Lab Results   Component Value Date    TSH 1.220 10/14/2024     Lab Results   Component Value Date    HGBA1C 4.70 (L) 2025        Microalbumin, Urine   Date Value Ref Range Status   2025 <1.2 mg/dL Final           Objective     Vital Signs:  /71 (BP Location: Right arm, Patient Position:  "Sitting, Cuff Size: Adult)   Pulse 88   Ht 167.6 cm (66\")   Wt 75.3 kg (166 lb)   BMI 26.79 kg/m²   Estimated body mass index is 26.79 kg/m² as calculated from the following:    Height as of this encounter: 167.6 cm (66\").    Weight as of this encounter: 75.3 kg (166 lb).         Cardiovascular:      PMI at left midclavicular line. Normal rate. Regular rhythm. Normal S1. Normal S2.       Murmurs: There is no murmur.      No gallop.  No click. No rub.   Pulses:     Intact distal pulses.   Edema:     Peripheral edema absent.       ECG 12 Lead    Date/Time: 5/2/2025 12:31 PM  Performed by: Beverly Green MD    Authorized by: Beverly Green MD  Comparison: compared with previous ECG from 5/17/2023  Similar to previous ECG  Rhythm: sinus rhythm  Rate: normal  ST Segments: ST segments normal  QRS axis: normal    Clinical impression: normal ECG  Comments: NSR, No change cw previous EKG.         Assessment and Plan     Assessment & Plan  Hyperlipidemia LDL goal <55  Within goal  Nl LPa  Continue low fat low cholesterol diet.  Continue Crestor 40 mg po daily  Lipid panel 6 months  Orders:  •  Hepatic Function Panel; Future  •  High Sensitivity CRP; Future  •  Lipoprotein A (LPA); Future  •  Lipid Panel; Future    Type 2 diabetes mellitus with hyperglycemia, without long-term current use of insulin  Diabetes is stable.   Continue current treatment regimen.  Diabetes will be reassessed in 6 months    Orders:  •  Hemoglobin A1c; Future    Primary hypertension  Hypertension is stable and controlled  Continue current treatment regimen.  Blood pressure will be reassessed in 6 months  .    Orders:  •  Comprehensive Metabolic Panel; Future  •  Microalbumin / Creatinine Urine Ratio - Urine, Clean Catch; Future  •  ECG 12 Lead; Future      Follow Up  No follow-ups on file.    Beverly Green MD    [facility] Cardiology  "

## 2025-05-02 ENCOUNTER — OFFICE VISIT (OUTPATIENT)
Age: 55
End: 2025-05-02
Payer: COMMERCIAL

## 2025-05-02 VITALS
HEART RATE: 88 BPM | SYSTOLIC BLOOD PRESSURE: 103 MMHG | WEIGHT: 166 LBS | BODY MASS INDEX: 26.68 KG/M2 | HEIGHT: 66 IN | DIASTOLIC BLOOD PRESSURE: 71 MMHG

## 2025-05-02 DIAGNOSIS — E78.5 HYPERLIPIDEMIA LDL GOAL <55: Primary | ICD-10-CM

## 2025-05-02 DIAGNOSIS — I10 PRIMARY HYPERTENSION: ICD-10-CM

## 2025-05-02 DIAGNOSIS — E11.65 TYPE 2 DIABETES MELLITUS WITH HYPERGLYCEMIA, WITHOUT LONG-TERM CURRENT USE OF INSULIN: ICD-10-CM

## 2025-05-06 ENCOUNTER — TELEPHONE (OUTPATIENT)
Dept: ENDOCRINOLOGY | Facility: CLINIC | Age: 55
End: 2025-05-06
Payer: COMMERCIAL

## 2025-05-06 NOTE — TELEPHONE ENCOUNTER
/78 sitting --> 110/78 standing (patient states at home, BP get as low as 90/60)    Discussed decreasing lasix to 20 mg daily until follow up appointment with Dr. Ric Wild   Discussed purchasing compression stockings to be worn daily  Educational materials on orthostatic hypotension provided Patient called office, stated that her zepbound is no longer covered by her insurance and that she was told she needed an appeal to get it covered. She said that the letter needed to go to [facility] Attn: Appeals and needs to be labeled as URGENT due to a 45 day time window to get approved. Said it can be mailed to [address] [address] [place] [address]. Patient stated that she would appreciate updates if Dr. Skinner is willing to do the appeal.

## 2025-05-07 ENCOUNTER — TELEPHONE (OUTPATIENT)
Dept: ENDOCRINOLOGY | Facility: CLINIC | Age: 55
End: 2025-05-07

## 2025-05-07 RX ORDER — TIRZEPATIDE 7.5 MG/.5ML
7.5 INJECTION, SOLUTION SUBCUTANEOUS WEEKLY
Qty: 6 ML | Refills: 5 | Status: SHIPPED | OUTPATIENT
Start: 2025-05-07

## 2025-05-07 NOTE — TELEPHONE ENCOUNTER
"PT CALLED IN REGARDS TO LETTER FOR APPEAL FOR ZEPBOUND. LAST WORD IN LETTER IS TYEPED OUT AS \"loly\" INSTEAD OF \"her\". PT WAS CONCERNED THIS MAY IMPACT DECISION. PT ALSO REQUESTED WHEN WE SEND LETTER WE PLACE \"URGENT\" ON THERE SOMEWHERE, OTHERWISE INSURANCE WILL DISREGARD.   "

## 2025-05-13 ENCOUNTER — APPOINTMENT (OUTPATIENT)
Dept: CT IMAGING | Facility: HOSPITAL | Age: 55
End: 2025-05-13
Payer: COMMERCIAL

## 2025-05-13 ENCOUNTER — HOSPITAL ENCOUNTER (EMERGENCY)
Facility: HOSPITAL | Age: 55
Discharge: HOME | End: 2025-05-13
Attending: EMERGENCY MEDICINE | Admitting: EMERGENCY MEDICINE
Payer: COMMERCIAL

## 2025-05-13 VITALS
OXYGEN SATURATION: 97 % | BODY MASS INDEX: 26.2 KG/M2 | SYSTOLIC BLOOD PRESSURE: 113 MMHG | HEART RATE: 92 BPM | HEIGHT: 66 IN | TEMPERATURE: 98.4 F | RESPIRATION RATE: 14 BRPM | WEIGHT: 163 LBS | DIASTOLIC BLOOD PRESSURE: 73 MMHG

## 2025-05-13 DIAGNOSIS — K59.00 CONSTIPATION, UNSPECIFIED CONSTIPATION TYPE: Primary | ICD-10-CM

## 2025-05-13 LAB
ALBUMIN SERPL-MCNC: 4.3 G/DL (ref 3.5–5.2)
ALBUMIN/GLOB SERPL: 1.4 G/DL
ALP SERPL-CCNC: 106 U/L (ref 39–117)
ALT SERPL W P-5'-P-CCNC: 11 U/L (ref 1–33)
ANION GAP SERPL CALCULATED.3IONS-SCNC: 13 MMOL/L (ref 5–15)
AST SERPL-CCNC: 21 U/L (ref 1–32)
BASOPHILS # BLD AUTO: 0.06 10*3/MM3 (ref 0–0.2)
BASOPHILS NFR BLD AUTO: 0.4 % (ref 0–1.5)
BILIRUB SERPL-MCNC: 0.7 MG/DL (ref 0–1.2)
BILIRUB UR QL STRIP: NEGATIVE
BUN SERPL-MCNC: 11 MG/DL (ref 6–20)
BUN/CREAT SERPL: 17.5 (ref 7–25)
CALCIUM SPEC-SCNC: 9.3 MG/DL (ref 8.6–10.5)
CHLORIDE SERPL-SCNC: 101 MMOL/L (ref 98–107)
CLARITY UR: CLEAR
CO2 SERPL-SCNC: 24 MMOL/L (ref 22–29)
COLOR UR: YELLOW
CREAT SERPL-MCNC: 0.63 MG/DL (ref 0.57–1)
D-LACTATE SERPL-SCNC: 1.4 MMOL/L (ref 0.5–2)
DEPRECATED RDW RBC AUTO: 41.9 FL (ref 37–54)
EGFRCR SERPLBLD CKD-EPI 2021: 104.3 ML/MIN/1.73
EOSINOPHIL # BLD AUTO: 0.05 10*3/MM3 (ref 0–0.4)
EOSINOPHIL NFR BLD AUTO: 0.3 % (ref 0.3–6.2)
ERYTHROCYTE [DISTWIDTH] IN BLOOD BY AUTOMATED COUNT: 12.7 % (ref 12.3–15.4)
GLOBULIN UR ELPH-MCNC: 3.1 GM/DL
GLUCOSE SERPL-MCNC: 98 MG/DL (ref 65–99)
GLUCOSE UR STRIP-MCNC: NEGATIVE MG/DL
HCT VFR BLD AUTO: 39.1 % (ref 34–46.6)
HGB BLD-MCNC: 13.1 G/DL (ref 12–15.9)
HGB UR QL STRIP.AUTO: NEGATIVE
HOLD SPECIMEN: NORMAL
IMM GRANULOCYTES # BLD AUTO: 0.04 10*3/MM3 (ref 0–0.05)
IMM GRANULOCYTES NFR BLD AUTO: 0.3 % (ref 0–0.5)
KETONES UR QL STRIP: ABNORMAL
LEUKOCYTE ESTERASE UR QL STRIP.AUTO: NEGATIVE
LIPASE SERPL-CCNC: 27 U/L (ref 13–60)
LYMPHOCYTES # BLD AUTO: 1.62 10*3/MM3 (ref 0.7–3.1)
LYMPHOCYTES NFR BLD AUTO: 10.5 % (ref 19.6–45.3)
MCH RBC QN AUTO: 29.8 PG (ref 26.6–33)
MCHC RBC AUTO-ENTMCNC: 33.5 G/DL (ref 31.5–35.7)
MCV RBC AUTO: 88.9 FL (ref 79–97)
MONOCYTES # BLD AUTO: 1.05 10*3/MM3 (ref 0.1–0.9)
MONOCYTES NFR BLD AUTO: 6.8 % (ref 5–12)
NEUTROPHILS NFR BLD AUTO: 12.56 10*3/MM3 (ref 1.7–7)
NEUTROPHILS NFR BLD AUTO: 81.7 % (ref 42.7–76)
NITRITE UR QL STRIP: NEGATIVE
NRBC BLD AUTO-RTO: 0 /100 WBC (ref 0–0.2)
PH UR STRIP.AUTO: 5.5 [PH] (ref 5–8)
PLATELET # BLD AUTO: 411 10*3/MM3 (ref 140–450)
PMV BLD AUTO: 9.7 FL (ref 6–12)
POTASSIUM SERPL-SCNC: 3.9 MMOL/L (ref 3.5–5.2)
PROT SERPL-MCNC: 7.4 G/DL (ref 6–8.5)
PROT UR QL STRIP: NEGATIVE
RBC # BLD AUTO: 4.4 10*6/MM3 (ref 3.77–5.28)
SODIUM SERPL-SCNC: 138 MMOL/L (ref 136–145)
SP GR UR STRIP: 1.02 (ref 1–1.03)
UROBILINOGEN UR QL STRIP: ABNORMAL
WBC NRBC COR # BLD AUTO: 15.38 10*3/MM3 (ref 3.4–10.8)
WHOLE BLOOD HOLD COAG: NORMAL
WHOLE BLOOD HOLD SPECIMEN: NORMAL

## 2025-05-13 PROCEDURE — 81003 URINALYSIS AUTO W/O SCOPE: CPT | Performed by: PHYSICIAN ASSISTANT

## 2025-05-13 PROCEDURE — 74177 CT ABD & PELVIS W/CONTRAST: CPT

## 2025-05-13 PROCEDURE — 99285 EMERGENCY DEPT VISIT HI MDM: CPT

## 2025-05-13 PROCEDURE — 25510000001 IOPAMIDOL 61 % SOLUTION: Performed by: EMERGENCY MEDICINE

## 2025-05-13 PROCEDURE — 85025 COMPLETE CBC W/AUTO DIFF WBC: CPT | Performed by: PHYSICIAN ASSISTANT

## 2025-05-13 PROCEDURE — 80053 COMPREHEN METABOLIC PANEL: CPT | Performed by: PHYSICIAN ASSISTANT

## 2025-05-13 PROCEDURE — 83690 ASSAY OF LIPASE: CPT | Performed by: PHYSICIAN ASSISTANT

## 2025-05-13 PROCEDURE — 83605 ASSAY OF LACTIC ACID: CPT | Performed by: PHYSICIAN ASSISTANT

## 2025-05-13 RX ORDER — SODIUM CHLORIDE 0.9 % (FLUSH) 0.9 %
10 SYRINGE (ML) INJECTION AS NEEDED
Status: DISCONTINUED | OUTPATIENT
Start: 2025-05-13 | End: 2025-05-13 | Stop reason: HOSPADM

## 2025-05-13 RX ORDER — IOPAMIDOL 612 MG/ML
85 INJECTION, SOLUTION INTRAVASCULAR
Status: COMPLETED | OUTPATIENT
Start: 2025-05-13 | End: 2025-05-13

## 2025-05-13 RX ADMIN — IOPAMIDOL 85 ML: 612 INJECTION, SOLUTION INTRAVENOUS at 16:04

## 2025-05-13 NOTE — ED PROVIDER NOTES
Subjective  History of Present Illness:    Chief Complaint: Constipation  History of Present Illness: 54-year-old female presents with constipation, she states the symptoms started this morning, she made several attempts to have a bowel movement unsuccessfully.  She states that she could feel a hard stool ball in her rectum, but was unable to have a bowel movement.  She has abdominal cramping, but no pain at this time.  She has a history of chronic constipation and uses MiraLAX occasionally.  No fever or chills no nausea or vomiting.  She denies any blood from her rectum.  Onset: Gradual  Duration: Symptoms started this morning  Exacerbating / Alleviating factors: Patient states she attempted to go to the bathroom to have a bowel movement several times  Associated symptoms: Abdominal cramping      Nurses Notes reviewed and agree, including vitals, allergies, social history and prior medical history.     REVIEW OF SYSTEMS: All systems reviewed and not pertinent unless noted.    Review of Systems   Gastrointestinal:  Positive for constipation.   All other systems reviewed and are negative.      Past Medical History:   Diagnosis Date    Anxiety     Chronic constipation     Chronic venous insufficiency     Cobalamin deficiency     Depression     Diabetes mellitus, type II     Dyspepsia     Dyspnea on exertion     Edema     Esophageal reflux     Fatigue     H. pylori infection     treated x 2, remotely    Hashimoto's thyroiditis     Heart murmur     Heart valve disease     Heartburn     prn TUMS    Hyperlipidemia     Hypoglycemia     Hypotension     Hypothyroidism     Irregular heart rhythm     Joint pain     (R) knee, (R) hip    Menopause     Mitral regurgitation     MILD BY ECHO    Morbid obesity     Non-toxic multinodular goiter     Obesity     Obstructive sleep apnea     Osteoporosis     weekly Fosamax [id number]    PAC (premature atrial contraction)     w/ tachycardia, [facility] [place] - no other cardiac  issues    Palpitations     Peripheral venous insufficiency 2019    Prediabetes     A1c 5.9    Pulmonic valve insufficiency     TRIVIAL BY ECHO    Shortness of breath     Sleep apnea     CPAP compliant    Thyroid goiter     follows w/ endocrinology, on Synthroid, episodic dysphagia    Tricuspid regurgitation     MILD BY ECHO    Vitamin D deficiency     Well controlled type 2 diabetes mellitus        Allergies:    Aleve [naproxen], Codeine, Escitalopram, and Lexapro [escitalopram oxalate]      Past Surgical History:   Procedure Laterality Date    APPENDECTOMY      during expl.lap    CARDIAC CATHETERIZATION      CARDIAC CATHETERIZATION  2009    ae @ [facility]je- ef 60%, nl coronaries    CARDIAC CATHETERIZATION  11/15/2011    ae.sje ef 65-70%. nml coronaries    CARDIAC CATHETERIZATION  2020    [facility]/ERES/PI:CAD/EF IS 80%/ HYPERDYNAMIC LV SYSTOLIC FUNCTION/NORMAL CORONARIES    CHOLECYSTECTOMY  2001    COLONOSCOPY  2019    unremarkable    DILATATION AND CURETTAGE      following miscarriage    EXPLORATORY LAPAROTOMY      for miscarriage, concomitant appy    GALLBLADDER SURGERY      GASTRIC SLEEVE LAPAROSCOPIC  2011    w/ Dr. Giron    LAPAROSCOPIC CHOLECYSTECTOMY      for stones         Social History     Socioeconomic History    Marital status:    Tobacco Use    Smoking status: Former     Current packs/day: 0.00     Average packs/day: 1 pack/day for 8.3 years (8.3 ttl pk-yrs)     Types: Cigarettes     Start date: 1982     Quit date: 1990     Years since quittin.6    Smokeless tobacco: Never   Vaping Use    Vaping status: Never Used   Substance and Sexual Activity    Alcohol use: No    Drug use: No    Sexual activity: Yes     Partners: Male     Birth control/protection: Post-menopausal, None         Family History   Problem Relation Age of Onset    COPD Mother     Heart attack Mother     Obesity Mother     Sleep apnea Mother     Arrhythmia Mother     Heart failure  "Mother     Thyroid disease Mother     Coronary artery disease Mother 68        three stents    Kidney failure Father     Kidney disease Father     Obesity Sister     Hyperlipidemia Sister     Obesity Sister     Hyperlipidemia Brother     Diabetes Maternal Grandmother     Hyperlipidemia Other        Objective  Physical Exam:  /73   Pulse 92   Temp 98.4 °F (36.9 °C) (Oral)   Resp 14   Ht 167.6 cm (66\")   Wt 73.9 kg (163 lb)   SpO2 97%   BMI 26.31 kg/m²      Physical Exam  Vitals and nursing note reviewed.   Constitutional:       Appearance: She is well-developed.   HENT:      Head: Normocephalic and atraumatic.   Cardiovascular:      Rate and Rhythm: Normal rate and regular rhythm.   Pulmonary:      Effort: Pulmonary effort is normal.      Breath sounds: Normal breath sounds.   Abdominal:      Palpations: Abdomen is soft.   Musculoskeletal:         General: Normal range of motion.      Cervical back: Normal range of motion and neck supple.   Skin:     General: Skin is warm and dry.   Neurological:      Mental Status: She is alert and oriented to person, place, and time.      Deep Tendon Reflexes: Reflexes are normal and symmetric.           Procedures    ED Course:    ED Course as of 05/13/25 2029   Tue May 13, 2025   1502 Review of previous  non ED visits, prior labs, prior imaging, available notes from prior evaluations or visits with specialists, medication list, allergies, past medical history, past surgical history        Reviewed recent note on 5/2/2025, with  for hyperlipidemia, and other comorbidities, plan of care and treatment plan [CS]      ED Course User Index  [CS] Arron Falk Jr., PAIVIS       Lab Results (last 24 hours)       Procedure Component Value Units Date/Time    CBC & Differential [[id number]]  (Abnormal) Collected: 05/13/25 1457    Specimen: Blood Updated: 05/13/25 1517    Narrative:      The following orders were created for panel order CBC & Differential.  Procedure     "                           Abnormality         Status                     ---------                               -----------         ------                     CBC Auto Differential[[id number]]        Abnormal            Final result                 Please view results for these tests on the individual orders.    Comprehensive Metabolic Panel [[id number]] Collected: 05/13/25 1457    Specimen: Blood Updated: 05/13/25 1541     Glucose 98 mg/dL      BUN 11 mg/dL      Creatinine 0.63 mg/dL      Sodium 138 mmol/L      Potassium 3.9 mmol/L      Chloride 101 mmol/L      CO2 24.0 mmol/L      Calcium 9.3 mg/dL      Total Protein 7.4 g/dL      Albumin 4.3 g/dL      ALT (SGPT) 11 U/L      AST (SGOT) 21 U/L      Alkaline Phosphatase 106 U/L      Total Bilirubin 0.7 mg/dL      Globulin 3.1 gm/dL      Comment: Calculated Result        A/G Ratio 1.4 g/dL      BUN/Creatinine Ratio 17.5     Anion Gap 13.0 mmol/L      eGFR 104.3 mL/min/1.73     Narrative:      GFR Categories in Chronic Kidney Disease (CKD)              GFR Category          GFR (mL/min/1.73)    Interpretation  G1                    90 or greater        Normal or high (1)  G2                    60-89                Mild decrease (1)  G3a                   45-59                Mild to moderate decrease  G3b                   30-44                Moderate to severe decrease  G4                    15-29                Severe decrease  G5                    14 or less           Kidney failure    (1)In the absence of evidence of kidney disease, neither GFR category G1 or G2 fulfill the criteria for CKD.    eGFR calculation 2021 CKD-EPI creatinine equation, which does not include race as a factor    Lipase [[id number]]  (Normal) Collected: 05/13/25 1457    Specimen: Blood Updated: 05/13/25 1541     Lipase 27 U/L     Lactic Acid, Plasma [[id number]]  (Normal) Collected: 05/13/25 1457    Specimen: Blood Updated: 05/13/25 1537     Lactate 1.4 mmol/L      Comment: Falsely depressed  results may occur on samples drawn from patients receiving N-Acetylcysteine (NAC) or Metamizole.       CBC Auto Differential [[id number]]  (Abnormal) Collected: 05/13/25 1457    Specimen: Blood Updated: 05/13/25 1517     WBC 15.38 10*3/mm3      RBC 4.40 10*6/mm3      Hemoglobin 13.1 g/dL      Hematocrit 39.1 %      MCV 88.9 fL      MCH 29.8 pg      MCHC 33.5 g/dL      RDW 12.7 %      RDW-SD 41.9 fl      MPV 9.7 fL      Platelets 411 10*3/mm3      Neutrophil % 81.7 %      Lymphocyte % 10.5 %      Monocyte % 6.8 %      Eosinophil % 0.3 %      Basophil % 0.4 %      Immature Grans % 0.3 %      Neutrophils, Absolute 12.56 10*3/mm3      Lymphocytes, Absolute 1.62 10*3/mm3      Monocytes, Absolute 1.05 10*3/mm3      Eosinophils, Absolute 0.05 10*3/mm3      Basophils, Absolute 0.06 10*3/mm3      Immature Grans, Absolute 0.04 10*3/mm3      nRBC 0.0 /100 WBC     Urinalysis With Microscopic If Indicated (No Culture) - Urine, Clean Catch [[id number]]  (Abnormal) Collected: 05/13/25 1458    Specimen: Urine, Clean Catch Updated: 05/13/25 1511     Color, UA Yellow     Appearance, UA Clear     pH, UA 5.5     Specific Gravity, UA 1.019     Glucose, UA Negative     Ketones, UA Trace     Bilirubin, UA Negative     Blood, UA Negative     Protein, UA Negative     Leuk Esterase, UA Negative     Nitrite, UA Negative     Urobilinogen, UA 1.0 E.U./dL    Narrative:      Urine microscopic not indicated.             CT Abdomen Pelvis With Contrast  Result Date: 5/13/2025  CT ABDOMEN PELVIS W CONTRAST Date of Exam: 5/13/2025 3:56 PM EDT Indication: stool impaction. Comparison: 4/18/2023 and prior Technique: Axial CT images were obtained of the abdomen and pelvis following the uneventful intravenous administration of intravenous Isovue. Reconstructed coronal and sagittal images were also obtained. Automated exposure control and iterative construction methods were used. FINDINGS: Abdomen/Pelvis: Lower Chest: Limited imaging of the lung bases is  grossly clear. No free air is noted below the diaphragm. Organs: Patient is status post cholecystectomy. Mild dilation of the common duct and biliary collecting system is compatible with postcholecystectomy ectasia. Liver is otherwise unremarkable in its appearance. The pancreas, spleen, kidneys and adrenal glands appear unremarkable in appearance GI/Bowel: Postsurgical changes from a gastric sleeve procedure noted. The stomach and small bowel are otherwise unremarkable in their appearance there is no evidence of obstruction or focal inflammatory change. No suspicious mesenteric adenopathy or fluid collection is noted. There is a moderate amount of stool in the rectal vault which appears somewhat inspissated in its appearance. No wall thickening or inflammatory change to suggest stercoral ulceration or proctitis. The remainder of the colon demonstrates well formed stool. No inflammatory changes noted. The ileocecal valve is unremarkable. Appendix is not clearly identified. There are scattered diverticuli more prominently within the sigmoid colon. Pelvis: The uterus and the ovaries appear unremarkable in appearance. There is no suspicious pelvic adenopathy or fluid collection noted Peritoneum/Retroperitoneum: The aorta is normal in caliber. There is no suspicious retroperitoneal adenopathy Bones/Soft Tissues: There is some induration of the subcutaneous fat in the lower abdominal wall. No suspicious fluid collections noted. No acute osseous abnormality appreciated     Impression: 1.Moderate amount of stool in the rectal vault which appears somewhat inspissated. No wall thickening or inflammatory change to suggest stercoral ulceration or proctitis. 2.Diverticulosis without evidence of diverticulitis. 3.Other incidental findings as above. Electronically Signed: Bandar Armenta MD  5/13/2025 4:24 PM EDT  Workstation ID: [id number]                                                                    Medical Decision  Making  Problems Addressed:  Constipation, unspecified constipation type: complicated acute illness or injury    Amount and/or Complexity of Data Reviewed  External Data Reviewed: labs, radiology and notes.  Labs: ordered. Decision-making details documented in ED Course.  Radiology: ordered. Decision-making details documented in ED Course.    Risk  Prescription drug management.          Final diagnoses:   Constipation, unspecified constipation type           Disposition DISCHARGE    Patient discharged in stable condition.    Reviewed implications of results, diagnosis, meds, responsibility to follow up, warning signs and symptoms of possible worsening, potential complications and reasons to return to ER.    Patient/Family voiced understanding of above instructions.    Discussed plan for discharge, as there is no emergent indication for admission.  Pt/family is agreeable and understands need for follow up and possible repeat testing.  Pt/family is aware that discharge does not mean that nothing is wrong but that it indicates no emergency is currently present that requires admission and they must continue care with follow-up as given below or with a physician of their choice.     FOLLOW-UP  [facility] EMERGENCY DEPARTMENT  [address] [facility] [address] [phone]    If symptoms worsen    Kathryn Lomax, TAMMY  [address] [place] [address] [phone]    Schedule an appointment as soon as possible for a visit            Medication List      No changes were made to your prescriptions during this visit.              Arron Falk Jr., KOJO  05/13/25 2029

## 2025-06-11 ENCOUNTER — OFFICE VISIT (OUTPATIENT)
Dept: CARDIOLOGY | Facility: CLINIC | Age: 55
End: 2025-06-11
Payer: COMMERCIAL

## 2025-06-11 ENCOUNTER — PATIENT ROUNDING (BHMG ONLY) (OUTPATIENT)
Dept: CARDIOLOGY | Facility: CLINIC | Age: 55
End: 2025-06-11
Payer: COMMERCIAL

## 2025-06-11 VITALS
OXYGEN SATURATION: 98 % | HEART RATE: 77 BPM | SYSTOLIC BLOOD PRESSURE: 110 MMHG | DIASTOLIC BLOOD PRESSURE: 80 MMHG | WEIGHT: 160.8 LBS | BODY MASS INDEX: 25.84 KG/M2 | HEIGHT: 66 IN

## 2025-06-11 DIAGNOSIS — I49.1 PAC (PREMATURE ATRIAL CONTRACTION): Primary | ICD-10-CM

## 2025-06-11 DIAGNOSIS — I47.19 ATRIAL TACHYCARDIA: ICD-10-CM

## 2025-06-11 PROCEDURE — 99214 OFFICE O/P EST MOD 30 MIN: CPT | Performed by: INTERNAL MEDICINE

## 2025-06-11 NOTE — PROGRESS NOTES
"June 11, 2025    Hello, may I speak with Tereza Eid? Yes.    My name is Neli ANNA        I am with:   [facility]  [address]   [address]  [place] [id number] [phone].  [facility] Dept : E [facility] CARD [facility]    Before we get started may I verify your date of birth? 1970, Yes, correct.    \"I see today was your visit with Dr. Lou. Welcome to [facility]. We want to ensure that every patient has the best experience from scheduling to check in to their visit with the providers so before we get started with Check Out, is it ok to ask you a few questions regarding your experience?\"  yes    Tell me about your visit with us. What things went well?  Everything went very well & I got in quick, way before my appointment time, even though I had the incorrect time for my appointment.  (It was later, yet, you still accommodated me & got me in to be seen earlier; very nice.).     We're always looking for ways to make our patients' experiences even better. Do you have recommendations on ways we may improve?  no, you all did wonderfully; everyone is nice & very sweet.    Overall were you satisfied with your first visit to our practice? Yes, very.    I appreciate you taking the time to speak with me today. Is there anything else I can do for you? no       Thank you, and have a great day.   "

## 2025-06-11 NOTE — PROGRESS NOTES
Tereza Eid  1970  [phone]    06/11/2025    [facility] CARDIOLOGY     Referring Provider: No ref. provider found     Kathryn Lomax APRN  [address] [place] [address]    Chief Complaint   Patient presents with    PAC (premature atrial contraction)     1-Yr F/U       Problem List:     PAC's/NSAT  Longstanding history of PAC's/palpitations  CHADSVASc = 2, initiated on Xarelto  Echocardiogram 2/6/2019: EF 70%, mild MR/TR  Event monitor 5/21/2019: Predominantly normal sinus rhythm, PACs, questionable AF more consistent with PAC's/NSAT  ER visit 07/2019 with tachy-palpitations, work up   5/17/2023 5-day monitor with normal sinus rhythm rare PACs.  3/2024 8 day monitor NSR rare pac's, nsat  Echocardiogram 3/29/2024 LVEF 70% mild LVH trace AI mild MR mild TR  SOB  Heart cath Dr Green 1/16/2020 LVEF LVEF 60%, NL cors  CT coronary calcium score 14 database incomplete  Hyperlipidemia  Hypothyroidism  PRACHI, on CPAP  GERD  Anxiety  Surgical history  Appendectomy  Cholecystectomy  Gastrectomy sleeve  Allergies  Allergies   Allergen Reactions    Aleve [Naproxen] Palpitations    Codeine Palpitations and Other (See Comments)    Escitalopram Palpitations    Lexapro [Escitalopram Oxalate] Palpitations       Current Medications    Current Outpatient Medications:     aspirin 81 MG EC tablet, Take 1 tablet by mouth Daily., Disp: , Rfl:     atorvastatin (LIPITOR) 40 MG tablet, Take 1 tablet by mouth Daily., Disp: , Rfl:     levothyroxine (Synthroid) 88 MCG tablet, Take 1 tablet by mouth Daily., Disp: 90 tablet, Rfl: 3    nystatin (MYCOSTATIN) 329562 UNIT/GM powder, Nystatin 187081 UNIT/GM External Powder QTY: 30  Days: 15 Refills: 0  Written: 03/09/25 Patient Instructions:, Disp: , Rfl:     OneTouch Delica Lancets 33G misc, Testing 1x per day; E11.9, Disp: 50 each, Rfl: 11    OneTouch Verio test strip, USE 1 STRIP TO TEST DAILY, Disp: 90 each, Rfl: 1    Tirzepatide-Weight Management  "(Zepbound) 7.5 MG/0.5ML solution auto-injector, Inject 0.5 mL under the skin into the appropriate area as directed 1 (One) Time Per Week., Disp: 6 mL, Rfl: 5    History of Present Illness     Pt presents for follow up of PACs/nonsustained atrial tachycardia. Since we last saw the pt, pt had 2 emergency room visits since we last saw her.  In February 16, 2025 she was seen in [facility] after an episode of waking her up her heart rate was going approximately 120.  By the time she got to emergency room was about 102 bpm.  She received magnesium and was sent home.  Since then she has had no other tach arrhythmias.  In May 2025 she presented to our ER secondary to abdominal pain.  Workup was essentially negative except for diverticulosis since stool compaction.  Otherwise she has done well since those ER visits.  Denies any SOB, CP, LH, and dizziness. Denies any hospitalizations, ER visits, bleeding, or TIA/CVA symptoms. Overall feels well.  She lost approximately 76 pounds while she has been taking Zepbound over the past year.        Vitals:    06/11/25 0918   BP: 110/80   BP Location: Left arm   Patient Position: Sitting   Cuff Size: Adult   Pulse: 77   SpO2: 98%   Weight: 72.9 kg (160 lb 12.8 oz)   Height: 167.6 cm (66\")     Body mass index is 25.95 kg/m².  PE:  General: NAD  Neck: no JVD, no carotid bruits, no TM  Heart RRR, NL S1, S2, S4 present, no rubs, murmurs  Lungs: CTA, no wheezes, rhonchi, or rales  Abd: soft, non-tender, NL BS  Ext: No musculoskeletal deformities, no edema, cyanosis, or clubbing  Psych: normal mood and affect    Diagnostic Data:      Procedures    Review of twelve-lead EKG on May 2025 demonstrates normal sinus rhythm with no significant abnormalities noted.    1. PAC (premature atrial contraction)    2. Atrial tachycardia          Plan:    1. PAC/NSAT:   - doing well off medications.  1 episode in February sounds like sinus tachycardia.  Will monitor for now.  She knows to " notify us by cell telephone if things get worse.       F/up in 12 months

## 2025-07-07 ENCOUNTER — TELEPHONE (OUTPATIENT)
Age: 55
End: 2025-07-07
Payer: COMMERCIAL

## 2025-07-07 ENCOUNTER — TELEPHONE (OUTPATIENT)
Age: 55
End: 2025-07-07

## 2025-07-07 RX ORDER — NITROGLYCERIN 0.4 MG/1
0.4 TABLET SUBLINGUAL
COMMUNITY
End: 2025-07-07 | Stop reason: SDUPTHER

## 2025-07-07 RX ORDER — NITROGLYCERIN 0.4 MG/1
0.4 TABLET SUBLINGUAL
Qty: 10 TABLET | Refills: 0 | Status: SHIPPED | OUTPATIENT
Start: 2025-07-07 | End: 2025-07-08 | Stop reason: SDUPTHER

## 2025-07-07 NOTE — TELEPHONE ENCOUNTER
Pt called for refill on Nitroglycerin not on med list in [facility] but on chart in [facility] ok to refill.

## 2025-07-07 NOTE — TELEPHONE ENCOUNTER
Pt is not having chest pain but keeps script with her if needed  per Sybil Medina states ok to send in 10 tablets and if pt has chest pain she needs to call the office pt understands.

## 2025-07-08 RX ORDER — NITROGLYCERIN 0.4 MG/1
0.4 TABLET SUBLINGUAL
Qty: 25 TABLET | Refills: 1 | Status: SHIPPED | OUTPATIENT
Start: 2025-07-08

## 2025-07-08 RX ORDER — ATORVASTATIN CALCIUM 40 MG/1
40 TABLET, FILM COATED ORAL DAILY
Qty: 90 TABLET | Refills: 1 | Status: SHIPPED | OUTPATIENT
Start: 2025-07-08 | End: 2025-07-09 | Stop reason: SDUPTHER

## 2025-07-08 NOTE — TELEPHONE ENCOUNTER
Per patient phone call request:  Rx Refill Note  Requested Prescriptions     Signed Prescriptions Disp Refills    atorvastatin (LIPITOR) 40 MG tablet 90 tablet 1     Sig: Take 1 tablet by mouth Daily.     Authorizing Provider: LUIZA SHEA     Ordering User: PEGGY NICOLE    nitroglycerin (NITROSTAT) 0.4 MG SL tablet 25 tablet 1     Sig: Place 1 tablet under the tongue Every 5 (Five) Minutes As Needed for Chest Pain. Take no more than 3 doses in 15 minutes.     Authorizing Provider: LUIZA SHEA     Ordering User: PEGGY NICOLE      Last office visit with prescribing clinician: 5/2/2025   Last telemedicine visit with prescribing clinician: Visit date not found   Next office visit with prescribing clinician: 11/3/2025                        Pharmacy Info    Last Fill Date:  Rx Written Date:   Prescribed Qty:   Additional Details from Pharmacy:    Patient passed protocols per [place].         Pegyg Nicole MA  07/08/25, 16:43 EDT

## 2025-07-09 RX ORDER — ATORVASTATIN CALCIUM 40 MG/1
40 TABLET, FILM COATED ORAL DAILY
Qty: 90 TABLET | Refills: 1 | Status: SHIPPED | OUTPATIENT
Start: 2025-07-09

## 2025-07-28 ENCOUNTER — PRIOR AUTHORIZATION (OUTPATIENT)
Dept: ENDOCRINOLOGY | Facility: CLINIC | Age: 55
End: 2025-07-28
Payer: COMMERCIAL

## 2025-07-28 NOTE — TELEPHONE ENCOUNTER
ASHER HURST (Key: [id number])  Rx #: [id number]  Zepbound 7.5MG/0.5ML pen-injectors  Form  [facility] Electronic PA Form (2017 NCPDP)  Created  5 hours ago  Sent to Plan  1 minute ago  Plan Response  1 minute ago  Submit Clinical Questions  1 minute ago  Determination  Favorable  less than a minute ago  Message from Plan  Your PA request has been approved. Additional information will be provided in the approval communication. (Message 1147). Authorization Expiration Date: July 28, 2026.

## 2025-08-21 ENCOUNTER — PRIOR AUTHORIZATION (OUTPATIENT)
Dept: ENDOCRINOLOGY | Facility: CLINIC | Age: 55
End: 2025-08-21
Payer: COMMERCIAL

## 2025-08-29 ENCOUNTER — PRIOR AUTHORIZATION (OUTPATIENT)
Dept: ENDOCRINOLOGY | Facility: CLINIC | Age: 55
End: 2025-08-29
Payer: COMMERCIAL